# Patient Record
Sex: MALE | Race: WHITE
[De-identification: names, ages, dates, MRNs, and addresses within clinical notes are randomized per-mention and may not be internally consistent; named-entity substitution may affect disease eponyms.]

---

## 2017-10-13 ENCOUNTER — HOSPITAL ENCOUNTER (INPATIENT)
Dept: HOSPITAL 47 - EC | Age: 47
LOS: 7 days | Discharge: PSYCHIATRIC OR SUBSTANCE USE FACILITY | DRG: 853 | End: 2017-10-20
Payer: MEDICARE

## 2017-10-13 VITALS — BODY MASS INDEX: 26.9 KG/M2

## 2017-10-13 DIAGNOSIS — K80.00: ICD-10-CM

## 2017-10-13 DIAGNOSIS — N39.0: ICD-10-CM

## 2017-10-13 DIAGNOSIS — T73.0XXA: ICD-10-CM

## 2017-10-13 DIAGNOSIS — F23: ICD-10-CM

## 2017-10-13 DIAGNOSIS — Z79.899: ICD-10-CM

## 2017-10-13 DIAGNOSIS — E87.1: ICD-10-CM

## 2017-10-13 DIAGNOSIS — Z87.891: ICD-10-CM

## 2017-10-13 DIAGNOSIS — K76.0: ICD-10-CM

## 2017-10-13 DIAGNOSIS — E86.0: ICD-10-CM

## 2017-10-13 DIAGNOSIS — Z82.49: ICD-10-CM

## 2017-10-13 DIAGNOSIS — A41.9: Primary | ICD-10-CM

## 2017-10-13 DIAGNOSIS — K72.00: ICD-10-CM

## 2017-10-13 DIAGNOSIS — E87.6: ICD-10-CM

## 2017-10-13 LAB
ALP SERPL-CCNC: 2131 U/L (ref 38–126)
ALT SERPL-CCNC: 125 U/L (ref 21–72)
ANION GAP SERPL CALC-SCNC: 33 MMOL/L
APTT BLD: 28.4 SEC (ref 22–30)
AST SERPL-CCNC: 273 U/L (ref 17–59)
BASOPHILS # BLD AUTO: 0.1 K/UL (ref 0–0.2)
BASOPHILS NFR BLD AUTO: 0 %
BILIRUB UR QL STRIP.AUTO: (no result)
BUN SERPL-SCNC: 24 MG/DL (ref 9–20)
CALCIUM SPEC-MCNC: 10.1 MG/DL (ref 8.4–10.2)
CH: 30.1
CHCM: 34.5
CHLORIDE SERPL-SCNC: 81 MMOL/L (ref 98–107)
CO2 SERPL-SCNC: 19 MMOL/L (ref 22–30)
EOSINOPHIL # BLD AUTO: 0.1 K/UL (ref 0–0.7)
EOSINOPHIL NFR BLD AUTO: 0 %
ERYTHROCYTE [DISTWIDTH] IN BLOOD BY AUTOMATED COUNT: 5.5 M/UL (ref 4.3–5.9)
ERYTHROCYTE [DISTWIDTH] IN BLOOD: 14 % (ref 11.5–15.5)
GLUCOSE SERPL-MCNC: 180 MG/DL (ref 74–99)
HCT VFR BLD AUTO: 48.3 % (ref 39–53)
HDW: 2.73
HGB BLD-MCNC: 16 GM/DL (ref 13–17.5)
INR PPP: 1.2 (ref ?–1.2)
KETONES UR QL STRIP.AUTO: (no result)
LUC NFR BLD AUTO: 1 %
LYMPHOCYTES # SPEC AUTO: 1.5 K/UL (ref 1–4.8)
LYMPHOCYTES NFR SPEC AUTO: 11 %
MAGNESIUM SPEC-SCNC: 2.6 MG/DL (ref 1.6–2.3)
MCH RBC QN AUTO: 29 PG (ref 25–35)
MCHC RBC AUTO-ENTMCNC: 33.1 G/DL (ref 31–37)
MCV RBC AUTO: 87.8 FL (ref 80–100)
MONOCYTES # BLD AUTO: 0.6 K/UL (ref 0–1)
MONOCYTES NFR BLD AUTO: 5 %
NEUTROPHILS # BLD AUTO: 11.7 K/UL (ref 1.3–7.7)
NEUTROPHILS NFR BLD AUTO: 83 %
NON-AFRICAN AMERICAN GFR(MDRD): >60
PARTICLE COUNT: (no result)
PH UR: 6 [PH] (ref 5–8)
PHOSPHATE SERPL-MCNC: 4 MG/DL (ref 2.5–4.5)
POTASSIUM SERPL-SCNC: 3.3 MMOL/L (ref 3.5–5.1)
PROT SERPL-MCNC: 7.7 G/DL (ref 6.3–8.2)
PROT UR QL: (no result)
PT BLD: 12.3 SEC (ref 9–12)
RBC UR QL: >182 /HPF (ref 0–5)
SODIUM SERPL-SCNC: 133 MMOL/L (ref 137–145)
SP GR UR: 1.02 (ref 1–1.03)
UA BILLING (MACRO VS. MICRO): (no result)
UROBILINOGEN UR QL STRIP: 6 MG/DL (ref ?–2)
WBC # BLD AUTO: 0.12 10*3/UL
WBC # BLD AUTO: 14.1 K/UL (ref 3.8–10.6)
WBC #/AREA URNS HPF: 30 /HPF (ref 0–5)
WBC (PEROX): 14.59

## 2017-10-13 PROCEDURE — 36415 COLL VENOUS BLD VENIPUNCTURE: CPT

## 2017-10-13 PROCEDURE — 82550 ASSAY OF CK (CPK): CPT

## 2017-10-13 PROCEDURE — 96365 THER/PROPH/DIAG IV INF INIT: CPT

## 2017-10-13 PROCEDURE — 99285 EMERGENCY DEPT VISIT HI MDM: CPT

## 2017-10-13 PROCEDURE — 76705 ECHO EXAM OF ABDOMEN: CPT

## 2017-10-13 PROCEDURE — 82103 ALPHA-1-ANTITRYPSIN TOTAL: CPT

## 2017-10-13 PROCEDURE — 80074 ACUTE HEPATITIS PANEL: CPT

## 2017-10-13 PROCEDURE — 80306 DRUG TEST PRSMV INSTRMNT: CPT

## 2017-10-13 PROCEDURE — 71020: CPT

## 2017-10-13 PROCEDURE — 82728 ASSAY OF FERRITIN: CPT

## 2017-10-13 PROCEDURE — 87086 URINE CULTURE/COLONY COUNT: CPT

## 2017-10-13 PROCEDURE — 85610 PROTHROMBIN TIME: CPT

## 2017-10-13 PROCEDURE — 82075 ASSAY OF BREATH ETHANOL: CPT

## 2017-10-13 PROCEDURE — 83550 IRON BINDING TEST: CPT

## 2017-10-13 PROCEDURE — 96375 TX/PRO/DX INJ NEW DRUG ADDON: CPT

## 2017-10-13 PROCEDURE — 87390 HIV-1 AG IA: CPT

## 2017-10-13 PROCEDURE — 81001 URINALYSIS AUTO W/SCOPE: CPT

## 2017-10-13 PROCEDURE — 80320 DRUG SCREEN QUANTALCOHOLS: CPT

## 2017-10-13 PROCEDURE — 85730 THROMBOPLASTIN TIME PARTIAL: CPT

## 2017-10-13 PROCEDURE — 82390 ASSAY OF CERULOPLASMIN: CPT

## 2017-10-13 PROCEDURE — 87040 BLOOD CULTURE FOR BACTERIA: CPT

## 2017-10-13 PROCEDURE — 83735 ASSAY OF MAGNESIUM: CPT

## 2017-10-13 PROCEDURE — 84165 PROTEIN E-PHORESIS SERUM: CPT

## 2017-10-13 PROCEDURE — 84443 ASSAY THYROID STIM HORMONE: CPT

## 2017-10-13 PROCEDURE — 84100 ASSAY OF PHOSPHORUS: CPT

## 2017-10-13 PROCEDURE — 80053 COMPREHEN METABOLIC PANEL: CPT

## 2017-10-13 PROCEDURE — 70450 CT HEAD/BRAIN W/O DYE: CPT

## 2017-10-13 PROCEDURE — 83690 ASSAY OF LIPASE: CPT

## 2017-10-13 PROCEDURE — 82330 ASSAY OF CALCIUM: CPT

## 2017-10-13 PROCEDURE — 85025 COMPLETE CBC W/AUTO DIFF WBC: CPT

## 2017-10-13 PROCEDURE — 88304 TISSUE EXAM BY PATHOLOGIST: CPT

## 2017-10-13 PROCEDURE — 94760 N-INVAS EAR/PLS OXIMETRY 1: CPT

## 2017-10-13 PROCEDURE — 83516 IMMUNOASSAY NONANTIBODY: CPT

## 2017-10-13 PROCEDURE — 96361 HYDRATE IV INFUSION ADD-ON: CPT

## 2017-10-13 PROCEDURE — 84132 ASSAY OF SERUM POTASSIUM: CPT

## 2017-10-13 PROCEDURE — 86038 ANTINUCLEAR ANTIBODIES: CPT

## 2017-10-13 PROCEDURE — 74177 CT ABD & PELVIS W/CONTRAST: CPT

## 2017-10-13 PROCEDURE — 93005 ELECTROCARDIOGRAM TRACING: CPT

## 2017-10-13 PROCEDURE — 83540 ASSAY OF IRON: CPT

## 2017-10-13 RX ADMIN — MORPHINE SULFATE SCH MG: 30 TABLET, FILM COATED, EXTENDED RELEASE ORAL at 22:28

## 2017-10-13 RX ADMIN — MORPHINE SULFATE SCH: 30 TABLET, FILM COATED, EXTENDED RELEASE ORAL at 16:35

## 2017-10-13 RX ADMIN — CEFAZOLIN SCH MLS/HR: 330 INJECTION, POWDER, FOR SOLUTION INTRAMUSCULAR; INTRAVENOUS at 15:15

## 2017-10-13 RX ADMIN — ONDANSETRON PRN MG: 2 INJECTION INTRAMUSCULAR; INTRAVENOUS at 15:45

## 2017-10-13 NOTE — P.HPIM
History of Present Illness


H&P Date: 10/13/17


Chief Complaint: Brought here by family with complaints of delusions





The patient is a 46-year-old  male with a past history of 

schizophrenia who presents to the ER via private vehicle under the current her 

vision of his family.  The family reports the patient has been acting 

increasingly paranoid and has not been eating over the last 52 days, they first 

noticed a change in his behavior 2 weeks to a month ago, they report the 

patient has been increasingly withdrawn has not been as communicative via phone 

calls or text messages, and has become increasingly agitated and disheveled.  

He reports that the patient has been hearing voices. 


The patient is slightly agitated but is relatively pleasant, he reports some 

paranoid delusions of being monitored by [employer] agents are interested 

in him because of a special brainwave that he posesses.  He reports that these 

government agents are monitoring him from across the street.  He reports that 

they placed a implantable device in his skull several years ago and have been 

able to translate his thoughts into text without him speaking.  The patient 

reports that he is attempting to prove a point to these government rogue agents 

that he can commit suicide by not eating, he does report some episodes of 

nausea and vomiting.  But otherwise has no somatic complaints Other than 

chronic back pain for which he takes morphine.  The patient reports that 

approximately 50 pound weight loss and reports that his urine is tea colored.  

In the ER spoke to his family who are concerned, they have completed a 

[facility] form.  





Past Medical History


Additional Past Medical History / Comment(s): back pain


History of Any Multi-Drug Resistant Organisms: None Reported


Past Surgical History: Back Surgery, Tonsillectomy


Past Anesthesia/Blood Transfusion Reactions: No Reported Reaction


Past Psychological History: Depression


Smoking Status: Former smoker


Past Alcohol Use History: None Reported


Past Drug Use History: None Reported





Medications and Allergies


 Home Medications











 Medication  Instructions  Recorded  Confirmed  Type


 


Doxazosin [Cardura] 1 mg PO DAILY 10/13/17 10/13/17 History


 


Morphine Sulfate [Morphine Sulfate 30 mg PO TID 10/13/17 10/13/17 History





ER]    


 


predniSONE 12.5 mg PO DAILY 10/13/17 10/13/17 History











 Allergies











Allergy/AdvReac Type Severity Reaction Status Date / Time


 


No Known Allergies Allergy   Verified 10/13/17 11:39














Physical Exam


Vitals: 


 Vital Signs











  Temp Pulse Resp BP Pulse Ox


 


 10/13/17 15:16   102 H  18  117/84  100


 


 10/13/17 13:35  98.1 F  87  16  121/82  100


 


 10/13/17 12:54   84  18  132/81  94 L


 


 10/13/17 11:06  97.7 F  154 H  18  114/80  100








 Intake and Output











 10/13/17 10/13/17 10/13/17





 06:59 14:59 22:59


 


Other:   


 


  Weight  77.428 kg 








 Patient Weight











 10/14/17





 06:59


 


Weight 77.428 kg














Constitutional: , conversant, pleasant





Eyes: Anicteric sclerae, moist conjunctiva, no lid-lag, PERRLA





ENMT: NC/AT,Oropharynx clear, no erythema, exudates, dry mucous membranes





Neck:Supple, FROM, no masses, or JVD, No carotid bruits; No thyromegaly





Lungs: Clear to auscultation, Clear to percussion, Normal respiratory effort, 

no accessory muscle use 





Cardiovascular: Heart regular in rate and rhythm,  No murmurs, gallops, or rubs 

no peripheral edema





Abdominal: Soft Nontender, nom distended, no guarding, no rebound or  rigidity, 

Normoactive bowel sounds No hepatomegaly, No splenomegaly,  No palpable mass No 

abdominal wall hernia noted 





Skin: Normal temperature, tone, texture, and increased skin tenting, No 

induration No subcutaneous nodules, No rash, lesions, No ulcers





Extremities:No digital cyanosis No clubbing, Pedal pulses intact and  

symmetrical Radial pulses intact and symmetrical Normal gait and station, No 

calf tenderness


 


Psychiatric: Alert and oriented to person, place and time, anxious, disheveled, 

paranoid





Neuro: Muscles Strength 5/5 in all 4 extremities, Sensation to light touch 

grossly present throughout, Cranial nerves II-XII grossly intact. No focal 

sensory deficits








Results


CBC & Chem 7: 


 10/13/17 11:21





 10/13/17 11:21


Labs: 


 Abnormal Lab Results - Last 24 Hours (Table)











  10/13/17 10/13/17 10/13/17 Range/Units





  11:21 11:21 11:21 


 


WBC   14.1 H   (3.8-10.6)  k/uL


 


Plt Count   472 H   (150-450)  k/uL


 


Neutrophils #   11.7 H   (1.3-7.7)  k/uL


 


PT    12.3 H  (9.0-12.0)  sec


 


INR    1.2 H  (<1.2)  


 


Sodium  133 L    (137-145)  mmol/L


 


Potassium  3.3 L    (3.5-5.1)  mmol/L


 


Chloride  81 L    ()  mmol/L


 


Carbon Dioxide  19 L    (22-30)  mmol/L


 


BUN  24 H    (9-20)  mg/dL


 


Glucose  180 H    (74-99)  mg/dL


 


Ionized Calcium Lory  4.0 L    (4.5-5.3)  mg/dL


 


Magnesium  2.6 H    (1.6-2.3)  mg/dL


 


Total Bilirubin  5.6 H    (0.2-1.3)  mg/dL


 


AST  273 H    (17-59)  U/L


 


ALT  125 H    (21-72)  U/L


 


Alkaline Phosphatase  2131 H    ()  U/L


 


Creatine Kinase     ()  U/L


 


Lipase     ()  U/L














  10/13/17 10/13/17 Range/Units





  11:21 11:21 


 


WBC    (3.8-10.6)  k/uL


 


Plt Count    (150-450)  k/uL


 


Neutrophils #    (1.3-7.7)  k/uL


 


PT    (9.0-12.0)  sec


 


INR    (<1.2)  


 


Sodium    (137-145)  mmol/L


 


Potassium    (3.5-5.1)  mmol/L


 


Chloride    ()  mmol/L


 


Carbon Dioxide    (22-30)  mmol/L


 


BUN    (9-20)  mg/dL


 


Glucose    (74-99)  mg/dL


 


Ionized Calcium Lory    (4.5-5.3)  mg/dL


 


Magnesium    (1.6-2.3)  mg/dL


 


Total Bilirubin    (0.2-1.3)  mg/dL


 


AST    (17-59)  U/L


 


ALT    (21-72)  U/L


 


Alkaline Phosphatase    ()  U/L


 


Creatine Kinase  34 L   ()  U/L


 


Lipase   610 H  ()  U/L














Assessment and Plan


(1) Dehydration with hyponatremia


Status: Acute   





(2) Leukocytosis


Status: Acute   





(3) Hypokalemia


Status: Acute   





(4) Transaminitis


Status: Acute   





(5) Schizophrenia, chronic with acute exacerbation


Status: Acute   


Plan: 





The patient is admitted to the medical floor anticipate a greater than TWO 

midnight stay, with severe dehydration with hyponatremia hypokalemia secondary 

to acute schizophrenia exacerbation.  The patient is started on fluids, regular 

diet with antiemetics and electrolyte supplementation. We'll workup his 

transaminitis and abnormal liver studies with a right upper quadrant ultrasound 

to rule out any hepatobiliary disorder, with his leukocytosis we'll check a 

blood culture and urinalysis.  We'll also check HIV and acute hepatitis panel.  

We'll consult psychiatry for further recommendations, the patient will likely 

need to be transferred to medical health unit after he is clinically medically 

stable.

## 2017-10-13 NOTE — XR
EXAMINATION TYPE: XR chest 2V

 

DATE OF EXAM: 10/13/2017

 

COMPARISON: NONE

 

HISTORY: Weakness

 

TECHNIQUE:  Frontal and lateral views of the chest are obtained.

 

FINDINGS:  There are overlying cardiac leads. Cardiomediastinal silhouette, pulmonary vascularity and
 josie within normal limits. No airspace disease, pneumothorax, or pleural effusion.

 

IMPRESSION:  No acute cardiopulmonary process.

## 2017-10-13 NOTE — US
EXAMINATION TYPE: US abdomen limited

 

DATE OF EXAM: 10/13/2017

 

COMPARISON: NONE

 

CLINICAL HISTORY: transaminitis . Extremely limited and difficult exam due to overlying bowel gas 

 

EXAM MEASUREMENTS:

 

Liver Length:  14.2 cm   

Gallbladder Wall:  0.5 cm   

CBD:  0.5 cm

Right Kidney:  9.8 x 6.1 x 6.4 cm

 

 

 

Pancreas:  Obscured by bowel gas

Liver:  Limited evaluation due to overlying bowel gas and patient body habitus   

Gallbladder:  Echogenic debris visualized within the gallbladder, gallbladder wall is thickened 

**Evidence for sonographic Mackay's sign:  No

CBD:  wnl as visualized, distal portion obscured by bowel gas 

Right Kidney:  Cystic area visualized mid pole measuring 2.9 x 2.4 x 2.2 cm   

 

 

 

IMPRESSION: Limited exam. Correlate for cholecystitis. Cystic lesion right kidney, follow-up recommen
ded.

## 2017-10-14 LAB
ALP SERPL-CCNC: 1343 U/L (ref 38–126)
ALT SERPL-CCNC: 146 U/L (ref 21–72)
ANION GAP SERPL CALC-SCNC: 13 MMOL/L
AST SERPL-CCNC: 396 U/L (ref 17–59)
BASOPHILS # BLD AUTO: 0 K/UL (ref 0–0.2)
BASOPHILS NFR BLD AUTO: 0 %
BUN SERPL-SCNC: 22 MG/DL (ref 9–20)
CALCIUM SPEC-MCNC: 8.3 MG/DL (ref 8.4–10.2)
CH: 29.9
CHCM: 34.3
CHLORIDE SERPL-SCNC: 94 MMOL/L (ref 98–107)
CO2 SERPL-SCNC: 27 MMOL/L (ref 22–30)
EOSINOPHIL # BLD AUTO: 0 K/UL (ref 0–0.7)
EOSINOPHIL NFR BLD AUTO: 0 %
ERYTHROCYTE [DISTWIDTH] IN BLOOD BY AUTOMATED COUNT: 4 M/UL (ref 4.3–5.9)
ERYTHROCYTE [DISTWIDTH] IN BLOOD: 13.8 % (ref 11.5–15.5)
GLUCOSE SERPL-MCNC: 116 MG/DL (ref 74–99)
HCT VFR BLD AUTO: 35.1 % (ref 39–53)
HDW: 2.72
HGB BLD-MCNC: 11.5 GM/DL (ref 13–17.5)
LUC NFR BLD AUTO: 1 %
LYMPHOCYTES # SPEC AUTO: 1.1 K/UL (ref 1–4.8)
LYMPHOCYTES NFR SPEC AUTO: 18 %
MCH RBC QN AUTO: 28.8 PG (ref 25–35)
MCHC RBC AUTO-ENTMCNC: 32.9 G/DL (ref 31–37)
MCV RBC AUTO: 87.7 FL (ref 80–100)
MONOCYTES # BLD AUTO: 0.4 K/UL (ref 0–1)
MONOCYTES NFR BLD AUTO: 6 %
NEUTROPHILS # BLD AUTO: 4.6 K/UL (ref 1.3–7.7)
NEUTROPHILS NFR BLD AUTO: 74 %
NON-AFRICAN AMERICAN GFR(MDRD): >60
POTASSIUM SERPL-SCNC: 3.1 MMOL/L (ref 3.5–5.1)
PROT SERPL-MCNC: 5.5 G/DL (ref 6.3–8.2)
SODIUM SERPL-SCNC: 134 MMOL/L (ref 137–145)
WBC # BLD AUTO: 0.07 10*3/UL
WBC # BLD AUTO: 6.1 K/UL (ref 3.8–10.6)
WBC (PEROX): 6.32

## 2017-10-14 RX ADMIN — MORPHINE SULFATE SCH MG: 30 TABLET, FILM COATED, EXTENDED RELEASE ORAL at 08:34

## 2017-10-14 RX ADMIN — DOXAZOSIN SCH: 1 TABLET ORAL at 08:37

## 2017-10-14 RX ADMIN — PANTOPRAZOLE SODIUM SCH: 40 INJECTION, POWDER, FOR SOLUTION INTRAVENOUS at 08:41

## 2017-10-14 RX ADMIN — PANTOPRAZOLE SODIUM SCH MG: 40 INJECTION, POWDER, FOR SOLUTION INTRAVENOUS at 08:36

## 2017-10-14 RX ADMIN — CEFAZOLIN SCH MLS/HR: 330 INJECTION, POWDER, FOR SOLUTION INTRAMUSCULAR; INTRAVENOUS at 12:23

## 2017-10-14 RX ADMIN — POTASSIUM CHLORIDE SCH MEQ: 20 TABLET, EXTENDED RELEASE ORAL at 22:53

## 2017-10-14 RX ADMIN — SODIUM CHLORIDE SCH MLS/HR: 9 INJECTION, SOLUTION INTRAVENOUS at 13:43

## 2017-10-14 RX ADMIN — CEFAZOLIN SCH MLS/HR: 330 INJECTION, POWDER, FOR SOLUTION INTRAMUSCULAR; INTRAVENOUS at 04:03

## 2017-10-14 RX ADMIN — CEPHALEXIN SCH MG: 500 CAPSULE ORAL at 21:53

## 2017-10-14 RX ADMIN — ONDANSETRON PRN MG: 2 INJECTION INTRAMUSCULAR; INTRAVENOUS at 04:06

## 2017-10-14 RX ADMIN — MORPHINE SULFATE SCH MG: 30 TABLET, FILM COATED, EXTENDED RELEASE ORAL at 15:44

## 2017-10-14 RX ADMIN — CEFAZOLIN SCH: 330 INJECTION, POWDER, FOR SOLUTION INTRAMUSCULAR; INTRAVENOUS at 17:20

## 2017-10-14 RX ADMIN — SODIUM CHLORIDE SCH MLS/HR: 9 INJECTION, SOLUTION INTRAVENOUS at 11:53

## 2017-10-14 RX ADMIN — DOXAZOSIN SCH MG: 1 TABLET ORAL at 08:36

## 2017-10-14 RX ADMIN — CEFAZOLIN SCH MLS/HR: 330 INJECTION, POWDER, FOR SOLUTION INTRAMUSCULAR; INTRAVENOUS at 10:48

## 2017-10-14 RX ADMIN — CEFAZOLIN SCH MLS/HR: 330 INJECTION, POWDER, FOR SOLUTION INTRAMUSCULAR; INTRAVENOUS at 17:24

## 2017-10-14 RX ADMIN — CEPHALEXIN SCH MG: 500 CAPSULE ORAL at 10:46

## 2017-10-14 NOTE — P.CN
Psychiatric Consult





- .


Consult date: 10/14/17


Consult:: 





10/14/17 21:43


IDENTIFYING DATA: 46-year-old  male patient


HPI: Patient admitted to the medical floor [facility] with 

concerns of him not eating for the last 52 days.  Per chart history was 

admitted with dehydration/hyponatremia, hypokalemia, leukocytosis and 

transaminitis.  Per chart history the patient was not eating for the last 52 

days, there is notation in the chart about increased paranoia, increased 

agitation and disheveled.  Per chart history of change in behavior over the 

past 2 weeks including more withdrawn.  Per chart history there is notation 

regarding patient having thoughts about being monitored by government agents.  

The patient states "I don't want to get into it."  He says that he got caught 

starving himself to death, was trying to make a point to some people regarding 

committing suicide to get off a certain device.  He makes reference to this is 

the third time that they have done this to him and he does relate he went from 

230 pounds to 170 pounds.  He makes reference to starving himself for 103 days.

  Patient states that he started eating again, and relays is eating in the 

hospital.  He says he was hoping at one point that his parents would find him 

dead but doesn't feel that way now.  He says he came here for his family to get 

healthy.  He relates that he is not open to getting back on psychotropic 

medication.  There is a petition by a family member on the chart.


PAST PSYCHIATRIC HISTORY: Patient does of her previous admission to the 

inpatient psychiatric unit [facility] in November 2014.  At that time 

the impression was psychosis NOS.  And mood disorder secondary to general 

medical condition.  He was discharged on Risperdal 1 mg daily and to monitor 

bedtime, Cymbalta 20 mg twice a day, trazodone 100 mg at bedtime and Neurontin 

100 mg 3 times a day.  Per chart history has also been on Elavil and Zoloft in 

the past.  He relays "5 years ago they did it to me," and relays that it sent 

him to the [facility] for the first time.  He denies any recent outpatient 

treatment.


PMH: Back pain, back surgery, tonsillectomy, spinal stenosis


ALLERGIES: No known ALLERGIES


MEDICATIONS: Keflex, Cardura, milk of magnesia when necessary, MS Contin, 

Narcan when necessary, Zofran when necessary, Protonix


CHEMICAL DEPENDENCY HISTORY: Denies


FAMILY PSYCHIATRIC HISTORY:  None known


FAMILY CHEMICAL DEPENDENCY HISTORY: None known at this time


SOCIAL HISTORY: Not known


MENTAL STATUS EXAM: He is alert, found in his room lying in bed.  He was about 

to brush his teeth.  Patient relates several times during the interview that he 

'doesn't feel like getting into it' so not as much information is gathered.  He 

denies any current suicidal ideations.  He says he didn't have any to begin 

with.  He has not verbalize any thoughts of harm to others.  There is evidence 

of some persecutory thought content.  He makes reference to trying to make a 

point to some people to get off a certain device, he was starving himself.  He 

made reference to this is the third time they have done this to him.  He does 

not show any significant agitation.


IMPRESSIONS: Unspecified psychotic disorder


PLAN: Would recommend inpatient psychiatric hospitalization after medical 

stabilization.  At this time patient does not seem to be agreeable to treatment 

and relays that he is not open to getting back on psychotropic medication.  It 

is noted that there is a petition on the chart by family member.  Patient is 

still being medically stabilized.  Patient should not be allowed to leave the 

hospital AMA as he will need further psychiatric stabilization.  Concerns of 

him having started himself for a significant period of time with 60 pound 

weight loss and concern of psychosis symptoms.  He does relate that he got 

caught starving himself to death.  Psychiatry to follow up with him on the 

medical floor.

## 2017-10-14 NOTE — P.PN
Subjective


Progress Note Date: 10/14/17





Objective





- Vital Signs


Vital signs: 


 Vital Signs











Temp  96.5 F L  10/14/17 07:00


 


Pulse  77   10/14/17 08:00


 


Resp  16   10/14/17 08:00


 


BP  122/77   10/14/17 07:00


 


Pulse Ox  98   10/14/17 11:49








 Intake & Output











 10/13/17 10/14/17 10/14/17





 18:59 06:59 18:59


 


Intake Total 2000  


 


Output Total  200 800


 


Balance 2000 -200 -800


 


Weight 77.428 kg  


 


Intake:   


 


  Amount of Fluid Infused ( 2000  





  ml)   


 


Output:   


 


  Urine  200 


 


  Emesis   800


 


Other:   


 


  Voiding Method   Urinal














- Exam





Constitutional: No acute distress, conversant, pleasant





Eyes: Anicteric sclerae, moist conjunctiva, no lid-lag, PERRLA





ENMT: NC/AT,Oropharynx clear, no erythema, exudates





Neck:Supple, FROM, no masses, or JVD, No carotid bruits; No thyromegaly





Lungs: Clear to auscultation, Clear to percussion, Normal respiratory effort, 

no accessory muscle use 





Cardiovascular: Heart regular in rate and rhythm,  No murmurs, gallops, or rubs 

no peripheral edema





Abdominal: Soft tender to palpation right upper quadrant, nom distended, no 

guarding, no rebound or  rigidity, Normoactive bowel sounds No hepatomegaly, No 

splenomegaly,  No palpable mass No abdominal wall hernia noted 





Skin: Normal temperature, tone, texture, turgor, No induration No subcutaneous 

nodules, No rash, lesions, No ulcers





Extremities:No digital cyanosis No clubbing, Pedal pulses intact and  

symmetrical Radial pulses intact and symmetrical Normal gait and station, No 

calf tenderness


 


Psychiatric: Alert and oriented to person, place and time, Appropriate affect 

Intact judgement   


      


Neuro: Muscles Strength 5/5 in all 4 extremities, Sensation to light touch 

grossly present throughout, Cranial nerves II-XII grossly intact. No focal 

sensory deficits








- Labs


CBC & Chem 7: 


 10/14/17 08:02





 10/14/17 08:02


Labs: 


 Abnormal Lab Results - Last 24 Hours (Table)











  10/13/17 10/13/17 10/13/17 Range/Units





  11:21 11:21 11:21 


 


RBC     (4.30-5.90)  m/uL


 


Hgb     (13.0-17.5)  gm/dL


 


Hct     (39.0-53.0)  %


 


Sodium  133 L    (137-145)  mmol/L


 


Potassium  3.3 L    (3.5-5.1)  mmol/L


 


Chloride  81 L    ()  mmol/L


 


Carbon Dioxide  19 L    (22-30)  mmol/L


 


BUN  24 H    (9-20)  mg/dL


 


Glucose  180 H    (74-99)  mg/dL


 


Calcium     (8.4-10.2)  mg/dL


 


Magnesium  2.6 H    (1.6-2.3)  mg/dL


 


Total Bilirubin  5.6 H    (0.2-1.3)  mg/dL


 


AST  273 H    (17-59)  U/L


 


ALT  125 H    (21-72)  U/L


 


Alkaline Phosphatase  2131 H    ()  U/L


 


Creatine Kinase   34 L   ()  U/L


 


Total Protein     (6.3-8.2)  g/dL


 


Albumin     (3.5-5.0)  g/dL


 


Lipase    610 H  ()  U/L


 


Urine Protein     (Negative)  


 


Urine Ketones     (Negative)  


 


Urine Blood     (Negative)  


 


Urine Bilirubin     (Negative)  


 


Ur Leukocyte Esterase     (Negative)  


 


Urine RBC     (0-5)  /hpf


 


Urine WBC     (0-5)  /hpf


 


Amorphous Sediment     (None)  /hpf


 


Urine Bacteria     (None)  /hpf


 


Urine Mucus     (None)  /hpf


 


Urine Opiates Screen     (NotDetected)  


 


U Marijuana (THC) Screen     (NotDetected)  














  10/13/17 10/14/17 10/14/17 Range/Units





  20:00 08:02 08:02 


 


RBC   4.00 L   (4.30-5.90)  m/uL


 


Hgb   11.5 L D   (13.0-17.5)  gm/dL


 


Hct   35.1 L   (39.0-53.0)  %


 


Sodium    134 L  (137-145)  mmol/L


 


Potassium    3.1 L  (3.5-5.1)  mmol/L


 


Chloride    94 L  ()  mmol/L


 


Carbon Dioxide     (22-30)  mmol/L


 


BUN    22 H  (9-20)  mg/dL


 


Glucose    116 H  (74-99)  mg/dL


 


Calcium    8.3 L  (8.4-10.2)  mg/dL


 


Magnesium     (1.6-2.3)  mg/dL


 


Total Bilirubin    2.7 H  (0.2-1.3)  mg/dL


 


AST    396 H  (17-59)  U/L


 


ALT    146 H  (21-72)  U/L


 


Alkaline Phosphatase    1343 H  ()  U/L


 


Creatine Kinase     ()  U/L


 


Total Protein    5.5 L  (6.3-8.2)  g/dL


 


Albumin    3.0 L  (3.5-5.0)  g/dL


 


Lipase     ()  U/L


 


Urine Protein  2+ H    (Negative)  


 


Urine Ketones  3+ H    (Negative)  


 


Urine Blood  Large H    (Negative)  


 


Urine Bilirubin  2+ H    (Negative)  


 


Ur Leukocyte Esterase  Moderate H    (Negative)  


 


Urine RBC  >182 H    (0-5)  /hpf


 


Urine WBC  30 H    (0-5)  /hpf


 


Amorphous Sediment  Rare H    (None)  /hpf


 


Urine Bacteria  Many H    (None)  /hpf


 


Urine Mucus  Many H    (None)  /hpf


 


Urine Opiates Screen  Detected H    (NotDetected)  


 


U Marijuana (THC) Screen  Detected H    (NotDetected)  














- Imaging and Cardiology


US - abdomen: report reviewed (Limited exam currently for cholecystitis, 

gallbladder bladder wall was thickened with evidence of sonographic Mackay sign)





Assessment and Plan


(1) Sepsis


Narrative/Plan: 





* Secondary to UTI versus acute cholecystitis urine cultures sent, initiated on 

oral Keflex today, afebrile leukocytosis resolved


* General surgery consulted to evaluate for lap cholecystectomy


Status: Acute   





(2) Dehydration with hyponatremia


Narrative/Plan: 





* Continue with IV fluids increase fluids to 1 50 mL per hour of normal saline


Status: Acute   





(3) Hypokalemia


Narrative/Plan: 





* Potassium still low we'll replace and recheck tomorrow


Status: Acute   





(4) Transaminitis


Narrative/Plan: 





* GI consult pending, hep panel HIV ordered, right upper quadrant ultrasound 

suggestive of possible acute cholecystitis


Status: Acute   





(5) Schizophrenia, chronic with acute exacerbation


Narrative/Plan: 





* Awaiting psychiatry consult


Status: Acute

## 2017-10-14 NOTE — CT
EXAMINATION TYPE: CT abdomen pelvis w con

 

DATE OF EXAM: 10/14/2017

 

REFERENCE: Previous ultrasound dated 10/13/2017

 

HISTORY: elevated LFT

HISTORY: Elevated liver enzymes, poss. gall bladder issues-per patient

 

REFERENCE: NONE

 

CT DLP: 764.80 mGy

Automated exposure control for dose reduction was used.

 

TECHNIQUE: Helical acquisition through the abdomen and pelvis was obtained following the oral ingesti
on of with Oral Contrast and following intravenous administration of 100 mL of Omnipaque 300. The yasmin
a was reformatted in axial, coronal and sagittal projections.

 

FINDINGS:  Visualized portions of the lungs are clear. There is no pleural or pericardial fluid. The 
heart is not enlarged.

 

Within the abdomen, the liver is normal in size. The liver is mildly fatty infiltrated. The spleen is
 normal. The gallbladder is unremarkable. I do not see evidence of pericholecystic fluid.

 

Both adrenal glands are normal.

 

There is a simple appearing, 4.2 cm exophytic cyst seen arising from the lower pole of the left kidne
y. There is a second, simple appearing, 2.6 cm lesion arising from the upper pole of the left kidney.
 There is a 2.7 cm medullary cyst seen arising from the mid polar region of the right kidney.

 

The pancreas is mildly fatty infiltrated.

 

There is no significant retroperitoneal, inguinal or iliac adenopathy.

 

The bladder is not distended. There is a small calcification present at the level of the right ureter
ovesicular junction. There is no associated hydroureter. This measures approximately 4 mm.

 

 There is no significant diverticular change and there is no evidence of diverticulitis. The appendix
 is normal.

 

Small bowel loops are normal in caliber.

 

No free fluid and no free air is seen.

 

There is been a previous interpedicular fusion at L5-S1. A sclerotic focus in the right femoral neck 
is likely a bone island. No bony destructive lesion is seen.

 

IMPRESSION: 

1. FATTY INFILTRATION OF THE LIVER.

2. SIMPLE APPEARING BILATERAL RENAL CYSTS.

3. CALCIFICATION AT THE LEVEL OF THE RIGHT UVJ MAY REPRESENT A CALCULUS WHICH IS NONOBSTRUCTING.

4. POSTSURGICAL CHANGES WITHIN THE SPINE.

## 2017-10-14 NOTE — CONS
CONSULTATION



DATE OF SERVICE:

10/14/17



REQUESTING PHYSICIAN:

Dr. Raymundo Ly.



REASON FOR CONSULTATION:

Elevated LFTs.



HISTORY OF PRESENT ILLNESS:

The patient is a 46-year-old pleasant white male who was admitted to the hospital with

history of schizophrenia, was admitted to the hospital because of progressive weakness,

nausea, vomiting, not feeling well for the last several days duration.  The patient

states that he has not been eating for the last 52 days duration and about 2 weeks ago

started having abdominal discomfort, nausea, vomiting, and not feeling well.  He came

to the emergency room.  He was noted to have elevated serum transaminases and elevated

bilirubin and alkaline phosphatase and hence we are consulted in regards to this issue.

The patient denies any liver disease in the past.  No history of jaundice or hepatitis.

No history of intravenous drug use.  He denies any new medications that were started

recently.  He denies any recent travel history.  Reports no alcohol use.  Does not

recall having any chronic liver disease in the past.



PAST MEDICAL HISTORY:

Significant for schizophrenia.



PAST SURGICAL HISTORY:

Tonsillectomy and back surgery.



MEDICATIONS:

At home:

1. Morphine sulfate.

2. Cardura.

3. Prednisone.



ALLERGIES:

None.



SOCIAL HISTORY:

No smoking or alcohol use.



FAMILY HISTORY:

Unremarkable.



REVIEW OF SYSTEMS:

Cardiopulmonary:  No chest pain, shortness of breath. Genitourinary: No dysuria or

hematuria.  Musculoskeletal: Unremarkable.  Skin:  Unremarkable.  Endocrine:

Unremarkable.  Psychiatric:  History of schizophrenia but he states he is not on any

medications recently.  Neurology:  Unremarkable.  ENT/ vision:  Unremarkable.

Constitutional:  No recent weight loss.  No fevers, chills or night sweats.



PHYSICAL EXAMINATION:

He appears comfortable.  No apparent distress.  VITAL SIGNS:  Stable.  Blood pressure

is 114/75, pulse rate 86, temperature 98.5. HEENT examination unremarkable.

Conjunctivae pink.  Sclerae anicteric.  Oral cavity no lesions. Neck: No JVD or lymph

node enlargement.  CHEST:  Clear to auscultation.  HEART:  Regular rate and rhythm.

ABDOMEN:  Soft.  Bowel sounds are positive.  No organomegaly.  Extremities:  No pedal

edema.  Skin:  No rashes.  Neuro:  She is alert and oriented x3.  No focal deficits.



LABS:

Done at the time of admission to the hospital:  WBC 6.1, hemoglobin 11.5, platelets are

normal.  T-bili 5.6, AST is 273, ALT is 125, alkaline phosphatase is 2131, today

alkaline phosphatase is down to 1343, T bili down to 2.7, , ALT is 146, lipase

is slightly up at 610.  INR is 1.2.



IMPRESSION:

This is a patient who presents to the hospital with  schizophrenia, presently on no

antipsychotic medications, presents to the hospital with nausea, vomiting, abdominal

discomfort, not feeling well for the last 2 weeks duration.  He states that he has been

starving for 52 days.  However, the patient does have schizophrenia as mentioned above.

He is noted to have elevated serum transaminases with significant elevation of alkaline

phosphatase and T-bilirubin consistent with intrahepatic cholestasis.  He did have

ultrasound of the gallbladder done and that showed no evidence of gallstones or acute

cholecystitis and no biliary ductal dilation. So most likely we are dealing with

intrahepatic pancreatic cholestasis, probably medication related, but on further

questioning, patient denies taking any medications other than prednisone, Cardura, and

some morphine sulfate.  None of which are likely to cause any medication induced

hepatitis.  Hence at this time, we will proceed with further workup for chronic liver

disease and also obtain a CT of the abdomen, pelvis to investigate further.



RECOMMENDATIONS:

1. CT of the abdomen and pelvis.

2. Workup for chronic liver disease.

3. Repeat labs in the morning and will follow up closely during hospital stay.

Thank you for this consultation.





MMODL / IJN: [id number] / Job#: [id number]

## 2017-10-15 LAB
ALP SERPL-CCNC: 807 U/L (ref 38–126)
ALP SERPL-CCNC: 834 U/L (ref 38–126)
ALT SERPL-CCNC: 72 U/L (ref 21–72)
ALT SERPL-CCNC: 79 U/L (ref 21–72)
ANION GAP SERPL CALC-SCNC: 12 MMOL/L
ANION GAP SERPL CALC-SCNC: 8 MMOL/L
AST SERPL-CCNC: 103 U/L (ref 17–59)
AST SERPL-CCNC: 91 U/L (ref 17–59)
BUN SERPL-SCNC: 11 MG/DL (ref 9–20)
BUN SERPL-SCNC: 14 MG/DL (ref 9–20)
CALCIUM SPEC-MCNC: 7.3 MG/DL (ref 8.4–10.2)
CALCIUM SPEC-MCNC: 7.4 MG/DL (ref 8.4–10.2)
CHLORIDE SERPL-SCNC: 96 MMOL/L (ref 98–107)
CHLORIDE SERPL-SCNC: 99 MMOL/L (ref 98–107)
CO2 SERPL-SCNC: 25 MMOL/L (ref 22–30)
CO2 SERPL-SCNC: 26 MMOL/L (ref 22–30)
GLUCOSE SERPL-MCNC: 145 MG/DL (ref 74–99)
GLUCOSE SERPL-MCNC: 95 MG/DL (ref 74–99)
IRON SERPL-MCNC: 62 UG/DL (ref 65–175)
NON-AFRICAN AMERICAN GFR(MDRD): >60
NON-AFRICAN AMERICAN GFR(MDRD): >60
POTASSIUM SERPL-SCNC: 2.9 MMOL/L (ref 3.5–5.1)
POTASSIUM SERPL-SCNC: 3.3 MMOL/L (ref 3.5–5.1)
PROT SERPL-MCNC: 4.5 G/DL (ref 6.3–8.2)
PROT SERPL-MCNC: 4.6 G/DL (ref 6.3–8.2)
SODIUM SERPL-SCNC: 133 MMOL/L (ref 137–145)
SODIUM SERPL-SCNC: 133 MMOL/L (ref 137–145)
TIBC SERPL-MCNC: 166 UG/DL (ref 228–460)

## 2017-10-15 RX ADMIN — CEFAZOLIN SCH MLS/HR: 330 INJECTION, POWDER, FOR SOLUTION INTRAMUSCULAR; INTRAVENOUS at 10:49

## 2017-10-15 RX ADMIN — PANTOPRAZOLE SODIUM SCH: 40 INJECTION, POWDER, FOR SOLUTION INTRAVENOUS at 07:57

## 2017-10-15 RX ADMIN — POTASSIUM CHLORIDE SCH: 20 TABLET, EXTENDED RELEASE ORAL at 10:47

## 2017-10-15 RX ADMIN — MORPHINE SULFATE SCH MG: 30 TABLET, FILM COATED, EXTENDED RELEASE ORAL at 16:27

## 2017-10-15 RX ADMIN — MORPHINE SULFATE SCH MG: 30 TABLET, FILM COATED, EXTENDED RELEASE ORAL at 00:04

## 2017-10-15 RX ADMIN — SODIUM CHLORIDE SCH MLS/HR: 9 INJECTION, SOLUTION INTRAVENOUS at 11:55

## 2017-10-15 RX ADMIN — CEFAZOLIN SCH: 330 INJECTION, POWDER, FOR SOLUTION INTRAMUSCULAR; INTRAVENOUS at 15:39

## 2017-10-15 RX ADMIN — CEFAZOLIN SCH MLS/HR: 330 INJECTION, POWDER, FOR SOLUTION INTRAMUSCULAR; INTRAVENOUS at 00:07

## 2017-10-15 RX ADMIN — MORPHINE SULFATE SCH MG: 30 TABLET, FILM COATED, EXTENDED RELEASE ORAL at 07:56

## 2017-10-15 RX ADMIN — MORPHINE SULFATE SCH MG: 30 TABLET, FILM COATED, EXTENDED RELEASE ORAL at 23:57

## 2017-10-15 RX ADMIN — POTASSIUM CHLORIDE SCH MEQ: 20 TABLET, EXTENDED RELEASE ORAL at 04:51

## 2017-10-15 RX ADMIN — CEPHALEXIN SCH MG: 500 CAPSULE ORAL at 20:24

## 2017-10-15 RX ADMIN — POTASSIUM CHLORIDE SCH MEQ: 20 TABLET, EXTENDED RELEASE ORAL at 00:06

## 2017-10-15 RX ADMIN — SODIUM CHLORIDE SCH MLS/HR: 9 INJECTION, SOLUTION INTRAVENOUS at 10:48

## 2017-10-15 RX ADMIN — POTASSIUM CHLORIDE SCH MEQ: 20 TABLET, EXTENDED RELEASE ORAL at 06:41

## 2017-10-15 RX ADMIN — DOXAZOSIN SCH: 1 TABLET ORAL at 07:57

## 2017-10-15 RX ADMIN — CEPHALEXIN SCH MG: 500 CAPSULE ORAL at 07:56

## 2017-10-15 RX ADMIN — POTASSIUM CHLORIDE SCH MEQ: 20 TABLET, EXTENDED RELEASE ORAL at 05:57

## 2017-10-15 NOTE — P.PN
Progress Note - Text


Progress Note Date: 10/15/17





Interval history: Patient seen in psychiatric follow-up today.  He relates that 

he won't pay me.  He is not agreeable to talk to me today.  He does have a male 

visitor sitting with him.





Mental status exam: Patient makes reference to that he won't pay me.  He is not 

agreeable to talk with me today.





Plan: Did relay to the patient that it is recommended for psychiatric 

stabilization.  He is not agreeable to talk with me today.  Petition is on the 

chart.  Recommend first clinical certain be done by the attending physician and 

then second clinical certain would be done by psychiatrist for involuntary 

admission recommended after medical stabilization.  Psychiatry will follow up.

## 2017-10-15 NOTE — PN
PROGRESS NOTE



The patient is a 46-year-old white male admitted to the hospital with abdominal pain,

some nausea, vomiting for the last 2 days duration.  The patient says that she has not

been eating for the last 2 to 3 days and has been having progressive weight loss

associated with generalized weakness. On admission to the hospital was noted to have

elevated serum transaminases as well as jaundice.  The patient has no history of

chronic liver disease.  As part of the workup he initially had ultrasound of the

abdomen done that was unremarkable except for gallbladder sludge. Subsequently CT of

abdomen and pelvis was ordered yesterday, which showed fatty liver; however, there was

no evidence of biliary ductal dilation or any pericholecystic fluid noted.  This

morning he states that he is doing well.  His abdominal pain has improved.  No further

episodes of nausea or vomiting.  He is able to eat well, tolerating diet.



PHYSICAL EXAMINATION:

Appears comfortable, in no apparent distress. Vital signs are stable. Blood pressure is

116/65, pulse is 94, temperature 96.7.

HEENT: Examination is unremarkable. Conjunctivae pink. Sclerae anicteric. Oral cavity,

no lesions. Neck: No jugular venous distention or lymph node enlargement. Chest was

clear to auscultation. Heart: Regular rate and rhythm.  Abdomen is soft, nontender,

nondistended.  Bowel sounds are positive.  No organomegaly. Extremities: No pedal

edema. Skin: No rashes. Neuro: He is alert and oriented x3.  No focal deficits.



LABS:

From today, AST is down to 103, ALT is down to 79, alkaline phosphatase is 834, and T

bilirubin is down to 1.3.  Basic metabolic panel is within normal limits and CBC is

within normal limits.



IMPRESSION:

The patient presents to the hospital with fatigue, weakness, and progressive weight

loss, not feeling well for the last several days duration associated with decreased

oral intake from intentional starvation. Noted to have elevated serum transaminases and

had intrahepatic cholestatic picture at the time of admission the hospital. Workup for

liver disease is still pending at the time of this dictation.  However, given the last

2 days his serum transaminases are improving and so is bilirubin and alkaline

phosphatase.  Ultrasound of the abdomen as well as CT of the abdomen did not show any

evidence of biliary ductal dilation, meaning that we are dealing with intrahepatic

cholestasis process at the present time.



RECOMMENDATIONS:

1. Await workup for chronic liver disease.

2. Discussed with the patient the CT scan findings.

3. We will follow him closely during his hospital stay.  If his liver counts continue

    to improve, we will just follow his serum transaminases.  Will continue to follow

    his LFTs closely.  However, if no obvious etiology may consider liver biopsy at a

    later date. The plan was discussed with the patient who is agreeable to it.

Thank you for this consultation.





RONNI / TRINI: [id number] / Job#: [id number]

## 2017-10-15 NOTE — P.GSCN
History of Present Illness


Consult date: 10/15/17


Reason for Consult: 





Cholelithiasis, cholecystitis


History of present illness: 





This a 46-year-old male who was admitted to the hospital.  Patient apparently 

admitted to starving himself.  He developed abdominal pain he was worked up 

found evidence of cholelithiasis and cholecystitis with thickened gallbladder 

wall.  Patient is currently being evaluated by psychiatry.





Past Medical History


Additional Past Medical History / Comment(s): back pain


History of Any Multi-Drug Resistant Organisms: None Reported


Past Surgical History: Back Surgery, Tonsillectomy


Past Anesthesia/Blood Transfusion Reactions: No Reported Reaction


Past Psychological History: Depression, Schizophrenia


Smoking Status: Former smoker


Past Alcohol Use History: None Reported


Past Drug Use History: None Reported





- Past Family History


  ** Mother


Family Medical History: Rheumatoid Arthritis (RA)





  ** Father


Family Medical History: Hypertension, Skin Disorder





Medications and Allergies


 Home Medications











 Medication  Instructions  Recorded  Confirmed  Type


 


Doxazosin [Cardura] 1 mg PO DAILY 10/13/17 10/13/17 History


 


Morphine Sulfate [Morphine Sulfate 30 mg PO TID 10/13/17 10/13/17 History





ER]    


 


predniSONE 12.5 mg PO DAILY 10/13/17 10/13/17 History











 Allergies











Allergy/AdvReac Type Severity Reaction Status Date / Time


 


No Known Allergies Allergy   Verified 10/13/17 11:39














Surgical - Exam


 Vital Signs











Temp Pulse Resp BP Pulse Ox


 


 97.7 F   154 H  18   114/80   100 


 


 10/13/17 11:06  10/13/17 11:06  10/13/17 11:06  10/13/17 11:06  10/13/17 11:06














- General


no distress





- Eyes


PERRL





- ENT


normal pinna





- Neck


no masses





- Respiratory


normal expansion





- Cardiovascular


Rhythm: regular





- Abdomen





Abdomen soft.  There is some mild right upper quadrant tenderness.  There is no 

rebound or guarding.





Results





- Labs





 10/14/17 08:02





 10/15/17 09:11


 Abnormal Lab Results - Last 24 Hours (Table)











  10/14/17 10/15/17 10/15/17 Range/Units





  21:17 03:11 09:11 


 


Sodium   133 L  133 L  (137-145)  mmol/L


 


Potassium  3.1 L  2.9 L*  3.3 L  (3.5-5.1)  mmol/L


 


Chloride   96 L   ()  mmol/L


 


Glucose    145 H  (74-99)  mg/dL


 


Calcium   7.4 L  7.3 L  (8.4-10.2)  mg/dL


 


AST   103 H  91 H  (17-59)  U/L


 


ALT   79 H   (21-72)  U/L


 


Alkaline Phosphatase   834 H  807 H  ()  U/L


 


Total Protein   4.5 L  4.6 L  (6.3-8.2)  g/dL


 


Albumin   2.3 L  2.4 L  (3.5-5.0)  g/dL








 Microbiology - Last 24 Hours (Table)











 10/13/17 20:00 Urine Culture - Preliminary





 Urine,Catheterized 


 


 10/13/17 14:34 Blood Culture - Preliminary





 Blood    No Growth after 24 hours


 


 10/13/17 14:35 Blood Culture - Preliminary





 Blood    No Growth after 24 hours








 Diabetes panel











  10/14/17 10/15/17 10/15/17 Range/Units





  21:17 03:11 09:11 


 


Sodium   133 L  133 L  (137-145)  mmol/L


 


Potassium  3.1 L  2.9 L*  3.3 L  (3.5-5.1)  mmol/L


 


Chloride   96 L  99  ()  mmol/L


 


Carbon Dioxide   25  26  (22-30)  mmol/L


 


BUN   14  11  (9-20)  mg/dL


 


Creatinine   0.80  0.80  (0.66-1.25)  mg/dL


 


Glucose   95  145 H  (74-99)  mg/dL


 


Calcium   7.4 L  7.3 L  (8.4-10.2)  mg/dL


 


AST   103 H  91 H  (17-59)  U/L


 


ALT   79 H  72  (21-72)  U/L


 


Alkaline Phosphatase   834 H  807 H  ()  U/L


 


Total Protein   4.5 L  4.6 L  (6.3-8.2)  g/dL


 


Albumin   2.3 L  2.4 L  (3.5-5.0)  g/dL








 Calcium panel











  10/15/17 10/15/17 Range/Units





  03:11 09:11 


 


Calcium  7.4 L  7.3 L  (8.4-10.2)  mg/dL


 


Albumin  2.3 L  2.4 L  (3.5-5.0)  g/dL








 Pituitary panel











  10/14/17 10/15/17 10/15/17 Range/Units





  21:17 03:11 09:11 


 


Sodium   133 L  133 L  (137-145)  mmol/L


 


Potassium  3.1 L  2.9 L*  3.3 L  (3.5-5.1)  mmol/L


 


Chloride   96 L  99  ()  mmol/L


 


Carbon Dioxide   25  26  (22-30)  mmol/L


 


BUN   14  11  (9-20)  mg/dL


 


Creatinine   0.80  0.80  (0.66-1.25)  mg/dL


 


Glucose   95  145 H  (74-99)  mg/dL


 


Calcium   7.4 L  7.3 L  (8.4-10.2)  mg/dL








 Adrenal panel











  10/14/17 10/15/17 10/15/17 Range/Units





  21:17 03:11 09:11 


 


Sodium   133 L  133 L  (137-145)  mmol/L


 


Potassium  3.1 L  2.9 L*  3.3 L  (3.5-5.1)  mmol/L


 


Chloride   96 L  99  ()  mmol/L


 


Carbon Dioxide   25  26  (22-30)  mmol/L


 


BUN   14  11  (9-20)  mg/dL


 


Creatinine   0.80  0.80  (0.66-1.25)  mg/dL


 


Glucose   95  145 H  (74-99)  mg/dL


 


Calcium   7.4 L  7.3 L  (8.4-10.2)  mg/dL


 


Total Bilirubin   1.3  1.2  (0.2-1.3)  mg/dL


 


AST   103 H  91 H  (17-59)  U/L


 


ALT   79 H  72  (21-72)  U/L


 


Alkaline Phosphatase   834 H  807 H  ()  U/L


 


Total Protein   4.5 L  4.6 L  (6.3-8.2)  g/dL


 


Albumin   2.3 L  2.4 L  (3.5-5.0)  g/dL














- Imaging


US - abdomen: report reviewed (5 mm thickened gallbladder wall, cholelithiasis)





Assessment and Plan


Plan: 





Cholelithiasis.  Cholecystitis.





Elevated liver function tests which have improved during his admission.





Patient should undergo laparoscopic cholecystectomy when he is medically clear, 

we will plan for laparoscopic cholecystectomy on Tuesday..

## 2017-10-15 NOTE — P.PN
Subjective


Progress Note Date: 10/15/17


Principal diagnosis: 





the patient is a 46-year-old  male with a history of schizophrenia the 

presented with acute paranoid delusions with exacerbation of his schizophrenia, 

with acute transaminitis secondary to likely acute cholecystitis and 

symptomatic cholelithiasis with ongoing electrolyte abnormalities hypokalemia. 

GI was consulted for further workup and general surgery was consulted and is 

planning a laparoscopic O cystectomy on Tuesday, 








patient in good spirits today, has no complaints, denies abdominal pain or 

nausea.  No acute events overnight





Objective





- Vital Signs


Vital signs: 


 Vital Signs











Temp  97.3 F L  10/15/17 07:00


 


Pulse  87   10/15/17 07:00


 


Resp  16   10/15/17 07:00


 


BP  111/68   10/15/17 07:00


 


Pulse Ox  100   10/15/17 07:00








 Intake & Output











 10/14/17 10/15/17 10/15/17





 18:59 06:59 18:59


 


Intake Total 700 400 


 


Output Total 800  


 


Balance -100 400 


 


Weight 77.428 kg 85.275 kg 


 


Intake:   


 


  Oral 700 400 


 


Output:   


 


  Emesis 800  


 


Other:   


 


  Voiding Method Urinal Toilet 


 


  # Voids 2 1 


 


  # Bowel Movements 1  














- Exam





Constitutional: No acute distress, conversant, pleasant





Eyes: Anicteric sclerae, moist conjunctiva, no lid-lag, PERRLA





ENMT: NC/AT,Oropharynx clear, no erythema, exudates





Neck:Supple, FROM, no masses, or JVD, No carotid bruits; No thyromegaly





Lungs: Clear to auscultation, Clear to percussion, Normal respiratory effort, 

no accessory muscle use 





Cardiovascular: Heart regular in rate and rhythm,  No murmurs, gallops, or rubs 

no peripheral edema





Abdominal: Soft tender to palpation right upper quadrant,positive Mackay sign 

nom distended, no guarding, no rebound or  rigidity, Normoactive bowel sounds 

No hepatomegaly, No splenomegaly,  No palpable mass No abdominal wall hernia 

noted 





Skin: Normal temperature, tone, texture, turgor, No induration No subcutaneous 

nodules, No rash, lesions, No ulcers





Extremities:No digital cyanosis No clubbing, Pedal pulses intact and  

symmetrical Radial pulses intact and symmetrical Normal gait and station, No 

calf tenderness


 


Psychiatric: Alert and oriented to person, place and time, Appropriate affect 

Intact judgement   


      


Neuro: Muscles Strength 5/5 in all 4 extremities, Sensation to light touch 

grossly present throughout, Cranial nerves II-XII grossly intact. No focal 

sensory deficits








- Labs


CBC & Chem 7: 


 10/14/17 08:02





 10/15/17 09:11


Labs: 


 Abnormal Lab Results - Last 24 Hours (Table)











  10/14/17 10/15/17 10/15/17 Range/Units





  21:17 03:11 09:11 


 


Sodium   133 L  133 L  (137-145)  mmol/L


 


Potassium  3.1 L  2.9 L*  3.3 L  (3.5-5.1)  mmol/L


 


Chloride   96 L   ()  mmol/L


 


Glucose    145 H  (74-99)  mg/dL


 


Calcium   7.4 L  7.3 L  (8.4-10.2)  mg/dL


 


AST   103 H  91 H  (17-59)  U/L


 


ALT   79 H   (21-72)  U/L


 


Alkaline Phosphatase   834 H  807 H  ()  U/L


 


Total Protein   4.5 L  4.6 L  (6.3-8.2)  g/dL


 


Albumin   2.3 L  2.4 L  (3.5-5.0)  g/dL








 Microbiology - Last 24 Hours (Table)











 10/13/17 20:00 Urine Culture - Preliminary





 Urine,Catheterized 


 


 10/13/17 14:34 Blood Culture - Preliminary





 Blood    No Growth after 24 hours


 


 10/13/17 14:35 Blood Culture - Preliminary





 Blood    No Growth after 24 hours














Assessment and Plan


(1) Sepsis


Narrative/Plan: 





* Secondary to UTI versus acute cholecystitis urine cultures sent, initiated on 

oral Keflex today, afebrile leukocytosis resolved


* General surgery Dr. Sanchez  planning lap cholecystectomy on Tuesday


Status: Acute   





(2) Dehydration with hyponatremia


Narrative/Plan: 





* Continue with IV fluids increase fluids to 1 50 mL per hour of normal saline


Status: Acute   





(3) Hypokalemia


Narrative/Plan: 





* Potassium still lowat 2.8 this morning we'll activate potassium replacement 

will call we'll replace and recheck tomorrow


Status: Acute   





(4) Transaminitis


Narrative/Plan: 





* likely secondary to acute cholecystitis


* GI Dr. Hensley Following, hep panel HIV ordered, right upper quadrant 

ultrasound suggestive of possible acute cholecystitis


Status: Acute   





(5) Schizophrenia, chronic with acute exacerbation


Narrative/Plan: 





* Awaiting psychiatry recommendations


Status: Acute

## 2017-10-16 LAB
ALP SERPL-CCNC: 631 U/L (ref 38–126)
ALT SERPL-CCNC: 62 U/L (ref 21–72)
ANION GAP SERPL CALC-SCNC: 6 MMOL/L
AST SERPL-CCNC: 46 U/L (ref 17–59)
BASOPHILS # BLD AUTO: 0 K/UL (ref 0–0.2)
BASOPHILS NFR BLD AUTO: 1 %
BUN SERPL-SCNC: 9 MG/DL (ref 9–20)
CALCIUM SPEC-MCNC: 7.3 MG/DL (ref 8.4–10.2)
CH: 28.1
CHCM: 31.2
CHLORIDE SERPL-SCNC: 104 MMOL/L (ref 98–107)
CO2 SERPL-SCNC: 27 MMOL/L (ref 22–30)
EOSINOPHIL # BLD AUTO: 0.1 K/UL (ref 0–0.7)
EOSINOPHIL NFR BLD AUTO: 2 %
ERYTHROCYTE [DISTWIDTH] IN BLOOD BY AUTOMATED COUNT: 3.42 M/UL (ref 4.3–5.9)
ERYTHROCYTE [DISTWIDTH] IN BLOOD: 12.9 % (ref 11.5–15.5)
GLUCOSE SERPL-MCNC: 109 MG/DL (ref 74–99)
HCT VFR BLD AUTO: 31 % (ref 39–53)
HDW: 2.83
HGB BLD-MCNC: 9.8 GM/DL (ref 13–17.5)
LUC NFR BLD AUTO: 2 %
LYMPHOCYTES # SPEC AUTO: 1.2 K/UL (ref 1–4.8)
LYMPHOCYTES NFR SPEC AUTO: 25 %
MCH RBC QN AUTO: 28.7 PG (ref 25–35)
MCHC RBC AUTO-ENTMCNC: 31.6 G/DL (ref 31–37)
MCV RBC AUTO: 90.6 FL (ref 80–100)
MONOCYTES # BLD AUTO: 0.4 K/UL (ref 0–1)
MONOCYTES NFR BLD AUTO: 7 %
NEUTROPHILS # BLD AUTO: 3.1 K/UL (ref 1.3–7.7)
NEUTROPHILS NFR BLD AUTO: 63 %
NON-AFRICAN AMERICAN GFR(MDRD): >60
POTASSIUM SERPL-SCNC: 3.5 MMOL/L (ref 3.5–5.1)
PROT SERPL-MCNC: 4.4 G/DL (ref 6.3–8.2)
SODIUM SERPL-SCNC: 137 MMOL/L (ref 137–145)
WBC # BLD AUTO: 0.09 10*3/UL
WBC # BLD AUTO: 4.9 K/UL (ref 3.8–10.6)
WBC (PEROX): 5.15

## 2017-10-16 RX ADMIN — DOXAZOSIN SCH: 1 TABLET ORAL at 08:22

## 2017-10-16 RX ADMIN — CEFAZOLIN SCH MLS/HR: 330 INJECTION, POWDER, FOR SOLUTION INTRAMUSCULAR; INTRAVENOUS at 00:30

## 2017-10-16 RX ADMIN — CEFAZOLIN SCH: 330 INJECTION, POWDER, FOR SOLUTION INTRAMUSCULAR; INTRAVENOUS at 04:51

## 2017-10-16 RX ADMIN — PANTOPRAZOLE SODIUM SCH MG: 40 INJECTION, POWDER, FOR SOLUTION INTRAVENOUS at 08:21

## 2017-10-16 RX ADMIN — CEFAZOLIN SCH MLS/HR: 330 INJECTION, POWDER, FOR SOLUTION INTRAMUSCULAR; INTRAVENOUS at 16:26

## 2017-10-16 RX ADMIN — MORPHINE SULFATE SCH MG: 30 TABLET, FILM COATED, EXTENDED RELEASE ORAL at 23:29

## 2017-10-16 RX ADMIN — CEFAZOLIN SCH MLS/HR: 330 INJECTION, POWDER, FOR SOLUTION INTRAMUSCULAR; INTRAVENOUS at 10:47

## 2017-10-16 RX ADMIN — CEPHALEXIN SCH MG: 500 CAPSULE ORAL at 20:59

## 2017-10-16 RX ADMIN — MORPHINE SULFATE SCH MG: 30 TABLET, FILM COATED, EXTENDED RELEASE ORAL at 16:26

## 2017-10-16 RX ADMIN — MORPHINE SULFATE SCH MG: 30 TABLET, FILM COATED, EXTENDED RELEASE ORAL at 21:00

## 2017-10-16 RX ADMIN — CEPHALEXIN SCH MG: 500 CAPSULE ORAL at 08:22

## 2017-10-16 RX ADMIN — MORPHINE SULFATE SCH MG: 30 TABLET, FILM COATED, EXTENDED RELEASE ORAL at 08:25

## 2017-10-16 RX ADMIN — HEPARIN SODIUM SCH UNIT: 5000 INJECTION, SOLUTION INTRAVENOUS; SUBCUTANEOUS at 23:31

## 2017-10-16 RX ADMIN — HEPARIN SODIUM SCH UNIT: 5000 INJECTION, SOLUTION INTRAVENOUS; SUBCUTANEOUS at 16:26

## 2017-10-16 RX ADMIN — CEFAZOLIN SCH MLS/HR: 330 INJECTION, POWDER, FOR SOLUTION INTRAMUSCULAR; INTRAVENOUS at 21:03

## 2017-10-16 RX ADMIN — POTASSIUM CHLORIDE SCH: 20 TABLET, EXTENDED RELEASE ORAL at 09:29

## 2017-10-16 NOTE — P.PN
Progress Note - Text


Progress Note Date: 10/16/17





Interval history: Patient seen in psychiatric follow-up today.  He refuses to 

speak to this provider, stating that he does not want to pay psychiatry.  Does 

have a meal tray in front of him and appears to be eating.  Spoke to patient's 

unit nurse who stated that patient has been eating all meals well.  Also, no 

reported hallucinations or delusions while on the medical floor.  





Mental status exam: Refusing to speak to this provider today.





Plan: Petition is on the chart. However, there has not been a certification 

done as of yet.  At this time, patient is no longer starving himself and not 

displaying any acutely psychotic symptoms warranting an involuntary admission 

in my opinion.  Patient is not willing to have psychiatric services 

voluntarily.  If he does display psychotic symptoms warranting further 

psychiatric intervention, please notify us and we will be glad to see patient 

again.  We will sign off for now.

## 2017-10-16 NOTE — P.PN
Subjective


Progress Note Date: 10/16/17





46-year-old male seen and examined at bedside patient reports he has 

intermittent episodes of denies any nausea vomiting.  Patient does give a 

history of having "tried to start myself with without food for 52 days drink a 

lot of water" patient states he has never tried that before but he was going 

through "some stuff" patient stated that he has lost about 75 pounds   patient 

is aware of the plan of care scheduled tomorrow for a lap cholecystectomy.  AST 

46, ALT 62, alkaline phosphatase 631 alpha-1 antitrypsin 149





Objective





- Vital Signs


Vital signs: 


 Vital Signs











Temp  97.2 F L  10/16/17 07:00


 


Pulse  79   10/16/17 07:00


 


Resp  20   10/16/17 07:00


 


BP  108/69   10/16/17 07:00


 


Pulse Ox  98   10/16/17 07:00








 Intake & Output











 10/15/17 10/16/17 10/16/17





 18:59 06:59 18:59


 


Intake Total 520 600 240


 


Balance 520 600 240


 


Weight  87.5 kg 


 


Intake:   


 


  Oral 520 600 240


 


Other:   


 


  # Voids 2  














- Exam





Physical exam


46-year-old male resting in bed talkative patient is stating that he cannot go 

to the mental health unit can afford the bill and he knows how to take care of 

things himself


Lungs essentially clear adequate air movement on room air


Heart S1-S2 audible regular


Abdomen soft not distended bowel tones present slight tenderness to the right 

lower quadrant no rebound no facial grimacing states urinating no difficulty no 

frequent stooling states no nausea vomiting and tolerating a diet


Extremities no edema





- Labs


CBC & Chem 7: 


 10/14/17 08:02





 10/16/17 08:49


Labs: 


 Abnormal Lab Results - Last 24 Hours (Table)











  10/14/17 10/14/17 10/16/17 Range/Units





  21:17 21:17 08:49 


 


Glucose    109 H  (74-99)  mg/dL


 


Calcium    7.3 L  (8.4-10.2)  mg/dL


 


Iron  62 L    ()  ug/dL


 


TIBC  166 L    (228-460)  ug/dL


 


Ferritin  1652.3 H    (22.0-322.0)  ng/mL


 


Alkaline Phosphatase    631 H  ()  U/L


 


Total Protein    4.4 L  (6.3-8.2)  g/dL


 


Total Protein (PEP)   4.9 L   (6.2-8.2)  g/dL


 


Albumin    2.2 L  (3.5-5.0)  g/dL








 Microbiology - Last 24 Hours (Table)











 10/13/17 20:00 Urine Culture - Final





 Urine,Catheterized 


 


 10/13/17 14:34 Blood Culture - Preliminary





 Blood    No Growth after 48 hours


 


 10/13/17 14:35 Blood Culture - Preliminary





 Blood    No Growth after 48 hours














Assessment and Plan


Plan: 





Impression


History of schizophrenia


Sepsis likely due to UTI


Abdominal pain present on admission suspect due to cholelithiasis and Acute 

cholecystitis


Present on admission dehydration resolved


Present on admission hyponatremia


Elevated liver enzymes trending down improving








Plan


Patient is scheduled tomorrow for a lap cholecystectomy


Pain control


Defer to admitting service and other consultants for medical issues


Repeat labs in am 


DVT and GI prophylaxis








The above impression and plan of care have been discussed and directed by 

signing physician. Tessie Leyva nurse practitioner acting as scribe for signing 

physician.

## 2017-10-16 NOTE — P.PN
Subjective


Progress Note Date: 10/16/17


Principal diagnosis: 





This 46-year-old male that was admitted secondary to delusions.  Initial workup 

showed elevation elevation of the liver enzymes and leukocytosis.  Patient 

found to have leukocytosis and urinary tract infection cultures have been 

negative and urine cultures positive for seble.





No patient denies any fever or chills at this time denies any dysuria 

hematuria.  Now complaining what seems like an exacerbation of his back pain.





Objective





- Vital Signs


Vital signs: 


 Vital Signs











Temp  97.2 F L  10/16/17 07:00


 


Pulse  79   10/16/17 07:00


 


Resp  20   10/16/17 07:00


 


BP  108/69   10/16/17 07:00


 


Pulse Ox  98   10/16/17 07:00








 Intake & Output











 10/15/17 10/16/17 10/16/17





 18:59 06:59 18:59


 


Intake Total 520 600 480


 


Balance 520 600 480


 


Weight  87.5 kg 


 


Intake:   


 


  Oral 520 600 480


 


Other:   


 


  # Voids 2  2














- Exam





gen:alert and oriented


lungs:clear to auscultation


heart:s1s2


abdomen:soft and depressible,non tender


ext:no edema





- Labs


CBC & Chem 7: 


 10/16/17 08:51





 10/16/17 08:49


Labs: 


 Abnormal Lab Results - Last 24 Hours (Table)











  10/14/17 10/14/17 10/16/17 Range/Units





  21:17 21:17 08:49 


 


RBC     (4.30-5.90)  m/uL


 


Hgb     (13.0-17.5)  gm/dL


 


Hct     (39.0-53.0)  %


 


Glucose    109 H  (74-99)  mg/dL


 


Calcium    7.3 L  (8.4-10.2)  mg/dL


 


Iron  62 L    ()  ug/dL


 


TIBC  166 L    (228-460)  ug/dL


 


Ferritin  1652.3 H    (22.0-322.0)  ng/mL


 


Alkaline Phosphatase    631 H  ()  U/L


 


Total Protein    4.4 L  (6.3-8.2)  g/dL


 


Total Protein (PEP)   4.9 L   (6.2-8.2)  g/dL


 


Albumin    2.2 L  (3.5-5.0)  g/dL














  10/16/17 Range/Units





  08:51 


 


RBC  3.42 L  (4.30-5.90)  m/uL


 


Hgb  9.8 L D  (13.0-17.5)  gm/dL


 


Hct  31.0 L  (39.0-53.0)  %


 


Glucose   (74-99)  mg/dL


 


Calcium   (8.4-10.2)  mg/dL


 


Iron   ()  ug/dL


 


TIBC   (228-460)  ug/dL


 


Ferritin   (22.0-322.0)  ng/mL


 


Alkaline Phosphatase   ()  U/L


 


Total Protein   (6.3-8.2)  g/dL


 


Total Protein (PEP)   (6.2-8.2)  g/dL


 


Albumin   (3.5-5.0)  g/dL








 Microbiology - Last 24 Hours (Table)











 10/13/17 20:00 Urine Culture - Final





 Urine,Catheterized 


 


 10/13/17 14:34 Blood Culture - Preliminary





 Blood    No Growth after 48 hours


 


 10/13/17 14:35 Blood Culture - Preliminary





 Blood    No Growth after 48 hours














Assessment and Plan


(1) Cholecystitis


Narrative/Plan: 








 plan for Cholecystectomy tomorrow


Status: Acute   





(2) Urinary tract infection


Narrative/Plan: 


Urine cultures negative continue Keflex


Status: Acute   





(3) Back pain


Narrative/Plan: 


Patient on morphine sulfate 30 mg 3 times a day as an outpatient.


Status: Acute   





(4) Acute psychosis


Narrative/Plan: 


Will need inpatient psychiatric evaluation and treatment.  Petition in the chart


Status: Acute

## 2017-10-17 LAB
ALP SERPL-CCNC: 522 U/L (ref 38–126)
ALT SERPL-CCNC: 50 U/L (ref 21–72)
ANION GAP SERPL CALC-SCNC: 10 MMOL/L
AST SERPL-CCNC: 29 U/L (ref 17–59)
BASOPHILS # BLD AUTO: 0.1 K/UL (ref 0–0.2)
BASOPHILS NFR BLD AUTO: 1 %
BUN SERPL-SCNC: 9 MG/DL (ref 9–20)
CALCIUM SPEC-MCNC: 7.3 MG/DL (ref 8.4–10.2)
CH: 29.8
CHCM: 32.6
CHLORIDE SERPL-SCNC: 104 MMOL/L (ref 98–107)
CO2 SERPL-SCNC: 24 MMOL/L (ref 22–30)
EOSINOPHIL # BLD AUTO: 0.1 K/UL (ref 0–0.7)
EOSINOPHIL NFR BLD AUTO: 1 %
ERYTHROCYTE [DISTWIDTH] IN BLOOD BY AUTOMATED COUNT: 3.3 M/UL (ref 4.3–5.9)
ERYTHROCYTE [DISTWIDTH] IN BLOOD: 14.6 % (ref 11.5–15.5)
GLUCOSE SERPL-MCNC: 96 MG/DL (ref 74–99)
HCT VFR BLD AUTO: 30.3 % (ref 39–53)
HDW: 2.79
HGB BLD-MCNC: 9.6 GM/DL (ref 13–17.5)
LUC NFR BLD AUTO: 1 %
LYMPHOCYTES # SPEC AUTO: 1.2 K/UL (ref 1–4.8)
LYMPHOCYTES NFR SPEC AUTO: 21 %
MCH RBC QN AUTO: 29 PG (ref 25–35)
MCHC RBC AUTO-ENTMCNC: 31.6 G/DL (ref 31–37)
MCV RBC AUTO: 91.9 FL (ref 80–100)
MONOCYTES # BLD AUTO: 0.5 K/UL (ref 0–1)
MONOCYTES NFR BLD AUTO: 9 %
NEUTROPHILS # BLD AUTO: 3.9 K/UL (ref 1.3–7.7)
NEUTROPHILS NFR BLD AUTO: 67 %
NON-AFRICAN AMERICAN GFR(MDRD): >60
POTASSIUM SERPL-SCNC: 3.8 MMOL/L (ref 3.5–5.1)
PROT SERPL-MCNC: 4.6 G/DL (ref 6.3–8.2)
SODIUM SERPL-SCNC: 138 MMOL/L (ref 137–145)
WBC # BLD AUTO: 0.08 10*3/UL
WBC # BLD AUTO: 5.8 K/UL (ref 3.8–10.6)
WBC (PEROX): 5.83

## 2017-10-17 PROCEDURE — 0FT44ZZ RESECTION OF GALLBLADDER, PERCUTANEOUS ENDOSCOPIC APPROACH: ICD-10-PCS

## 2017-10-17 RX ADMIN — MORPHINE SULFATE SCH: 30 TABLET, FILM COATED, EXTENDED RELEASE ORAL at 09:00

## 2017-10-17 RX ADMIN — MORPHINE SULFATE SCH MG: 30 TABLET, FILM COATED, EXTENDED RELEASE ORAL at 16:42

## 2017-10-17 RX ADMIN — ONDANSETRON PRN MG: 2 INJECTION INTRAMUSCULAR; INTRAVENOUS at 10:01

## 2017-10-17 RX ADMIN — CEFAZOLIN SCH MLS/HR: 330 INJECTION, POWDER, FOR SOLUTION INTRAMUSCULAR; INTRAVENOUS at 05:35

## 2017-10-17 RX ADMIN — CEPHALEXIN SCH MG: 500 CAPSULE ORAL at 20:43

## 2017-10-17 RX ADMIN — DOXAZOSIN SCH: 1 TABLET ORAL at 09:00

## 2017-10-17 RX ADMIN — POTASSIUM CHLORIDE SCH: 20 TABLET, EXTENDED RELEASE ORAL at 09:00

## 2017-10-17 RX ADMIN — KETOROLAC TROMETHAMINE SCH MG: 30 INJECTION, SOLUTION INTRAMUSCULAR at 18:04

## 2017-10-17 RX ADMIN — CEPHALEXIN SCH: 500 CAPSULE ORAL at 09:00

## 2017-10-17 RX ADMIN — HYDROMORPHONE HYDROCHLORIDE PRN MG: 1 INJECTION, SOLUTION INTRAMUSCULAR; INTRAVENOUS; SUBCUTANEOUS at 12:14

## 2017-10-17 RX ADMIN — CEFAZOLIN SCH MLS/HR: 330 INJECTION, POWDER, FOR SOLUTION INTRAMUSCULAR; INTRAVENOUS at 14:39

## 2017-10-17 RX ADMIN — KETOROLAC TROMETHAMINE SCH MG: 30 INJECTION, SOLUTION INTRAMUSCULAR at 13:41

## 2017-10-17 RX ADMIN — HYDROCODONE BITARTRATE AND ACETAMINOPHEN PRN EACH: 5; 325 TABLET ORAL at 14:29

## 2017-10-17 RX ADMIN — HYDROMORPHONE HYDROCHLORIDE PRN MG: 1 INJECTION, SOLUTION INTRAMUSCULAR; INTRAVENOUS; SUBCUTANEOUS at 12:25

## 2017-10-17 RX ADMIN — HEPARIN SODIUM SCH UNIT: 5000 INJECTION, SOLUTION INTRAVENOUS; SUBCUTANEOUS at 10:00

## 2017-10-17 RX ADMIN — DOCUSATE SODIUM SCH MG: 100 CAPSULE, LIQUID FILLED ORAL at 20:43

## 2017-10-17 RX ADMIN — PANTOPRAZOLE SODIUM SCH: 40 TABLET, DELAYED RELEASE ORAL at 07:30

## 2017-10-17 RX ADMIN — CEFAZOLIN SCH MLS/HR: 330 INJECTION, POWDER, FOR SOLUTION INTRAMUSCULAR; INTRAVENOUS at 20:42

## 2017-10-17 NOTE — P.PN
Subjective


Progress Note Date: 10/17/17


Principal diagnosis: 





This 46-year-old male that was admitted secondary to delusions.  Initial workup 

showed elevation elevation of the liver enzymes and leukocytosis.  Patient 

found to have leukocytosis and urinary tract infection cultures have been 

negative and urine cultures positive for seble.





No patient denies any fever or chills at this time denies any dysuria 

hematuria.  Complaining of some shortness in his abdominal site.  No nausea no 

vomiting 





Objective





- Vital Signs


Vital signs: 


 Vital Signs











Temp  97.0 F L  10/17/17 13:22


 


Pulse  114 H  10/17/17 13:22


 


Resp  20   10/17/17 13:22


 


BP  134/81   10/17/17 13:22


 


Pulse Ox  95   10/17/17 13:22








 Intake & Output











 10/16/17 10/17/17 10/17/17





 18:59 06:59 18:59


 


Intake Total 720  1750


 


Output Total   10


 


Balance 720  1740


 


Intake:   


 


  IV   1750


 


  Oral 720  


 


Output:   


 


  Estimated Blood Loss   10


 


Other:   


 


  # Voids 2 1 














- Exam





gen:alert and oriented


lungs:clear to auscultation


heart:s1s2


abdomen:soft and depressible,non tender, evidence of site where cholecystectomy 

was done,no bleeding


ext:no edema





- Labs


CBC & Chem 7: 


 10/17/17 08:13





 10/17/17 08:13


Labs: 


 Abnormal Lab Results - Last 24 Hours (Table)











  10/17/17 10/17/17 Range/Units





  08:13 08:13 


 


RBC   3.30 L  (4.30-5.90)  m/uL


 


Hgb   9.6 L  (13.0-17.5)  gm/dL


 


Hct   30.3 L  (39.0-53.0)  %


 


Calcium  7.3 L   (8.4-10.2)  mg/dL


 


Alkaline Phosphatase  522 H   ()  U/L


 


Total Protein  4.6 L   (6.3-8.2)  g/dL


 


Albumin  2.3 L   (3.5-5.0)  g/dL








 Microbiology - Last 24 Hours (Table)











 10/13/17 14:34 Blood Culture - Preliminary





 Blood    No Growth after 72 hours


 


 10/13/17 14:35 Blood Culture - Preliminary





 Blood    No Growth after 72 hours














Assessment and Plan


(1) Cholecystitis


Narrative/Plan: 


Status post cholecystectomy today


Now patient eating a liquid diet


Current Visit: Yes   Status: Acute   Code(s): K81.9 - CHOLECYSTITIS, 

UNSPECIFIED   SNOMED Code(s): 67152347


   





(2) Urinary tract infection


Narrative/Plan: 


Urine cultures negative continue Keflex


Current Visit: Yes   Status: Acute   Code(s): N39.0 - URINARY TRACT INFECTION, 

SITE NOT SPECIFIED   SNOMED Code(s): 40188135


   





(3) Back pain


Narrative/Plan: 


Patient on morphine sulfate 30 mg 3 times a day which is his outpatient 

treatment.


Current Visit: Yes   Status: Acute   Code(s): M54.9 - DORSALGIA, UNSPECIFIED   

SNOMED Code(s): 538294661


   





(4) Acute psychosis


Narrative/Plan: 


Will need inpatient psychiatric evaluation and treatment.  Petition in the chart


Current Visit: Yes   Status: Acute   Code(s): F23 - BRIEF PSYCHOTIC DISORDER   

SNOMED Code(s): 38836892


   


Plan: 





We'll monitor overnight dietary changes per surgery.  Patient encouraged to 

ambulate.  We will decrease IV fluids. patient will be going to inpatient 

psychiatric admission once stable from medical site

## 2017-10-17 NOTE — P.OP
Date of Procedure: 10/17/17


Preoperative Diagnosis: 


Cholecystitis


Postoperative Diagnosis: 


Cholecystitis


Procedure(s) Performed: 


Laparoscopic cholecystectomy


Anesthesia: ELLY


Surgeon: Jeremy Sanchez


Estimated Blood Loss (ml): 5


Pathology: other (Gallbladder, cholelithiasis)


Condition: stable


Disposition: PACU


Description of Procedure: 


The patient was placed on the operating table.   The patient received a general 

endotracheal tube anesthesia.    The patients abdomen was prepped and draped 

in the usual sterile fashion.    Through an infraumbilical stab incision, the 

fascia of the anterior abdominal wall was grasped with a pair of Kochers and 

then the Veress needle was placed in the peritoneal cavity.   Position of the 

Veress needle was confirmed with positive drop test.   The abdomen was then 

insufflated.  After adequate insufflation, the 10 mm trocar was placed in the 

peritoneal cavity.    Following this the laparoscope was placed in the 

peritoneal cavity. The patient was placed in the head-up, right side up 

position and then a 5 mm trocar was placed in the right lateral and right 

subcostal position under direct visualization.    A 8 mm trocar was placed in 

the epigastric position.  The gallbladder was grasped in the fundus and 

infundibulum.  Traction  on the gallbladder was placed in the lateral and the 

cephalad positions.  The triangle of Calot  was visualized..   The cystic duct 

was bluntly dissected until the union of the cystic duct and common bile duct 

was seen.    The cystic duct was then divided and sealed with the Harmonic 

scissors.  A PDS Endoloop was then placed throughout the cystic duct stump.  

The cystic artery divided and sealed with the Harmonic scissors.   The 

gallbladder was then removed from the liver bed using Harmonic scissors.   The 

gallbladder was then extracted through the epigastric port site.  Operative 

field was checked for any bleeding spots and Harmonic scissors was used to 

coagulate the liver bed.    The abdomen was irrigated.   The trocars were 

removed.  The skin was closed using interrupted 3-0 Vicryl suture.   Dermabond 

dressing were applied.   The patient tolerated the procedure well.

## 2017-10-18 RX ADMIN — CEFAZOLIN SCH MLS/HR: 330 INJECTION, POWDER, FOR SOLUTION INTRAMUSCULAR; INTRAVENOUS at 01:43

## 2017-10-18 RX ADMIN — HYDROCODONE BITARTRATE AND ACETAMINOPHEN PRN EACH: 5; 325 TABLET ORAL at 09:50

## 2017-10-18 RX ADMIN — CEFAZOLIN SCH: 330 INJECTION, POWDER, FOR SOLUTION INTRAMUSCULAR; INTRAVENOUS at 11:37

## 2017-10-18 RX ADMIN — DOCUSATE SODIUM SCH MG: 100 CAPSULE, LIQUID FILLED ORAL at 20:45

## 2017-10-18 RX ADMIN — KETOROLAC TROMETHAMINE SCH MG: 30 INJECTION, SOLUTION INTRAMUSCULAR at 18:14

## 2017-10-18 RX ADMIN — KETOROLAC TROMETHAMINE SCH MG: 30 INJECTION, SOLUTION INTRAMUSCULAR at 11:24

## 2017-10-18 RX ADMIN — PANTOPRAZOLE SODIUM SCH MG: 40 TABLET, DELAYED RELEASE ORAL at 08:05

## 2017-10-18 RX ADMIN — CEPHALEXIN SCH MG: 500 CAPSULE ORAL at 20:45

## 2017-10-18 RX ADMIN — KETOROLAC TROMETHAMINE SCH MG: 30 INJECTION, SOLUTION INTRAMUSCULAR at 00:08

## 2017-10-18 RX ADMIN — DOCUSATE SODIUM SCH MG: 100 CAPSULE, LIQUID FILLED ORAL at 08:04

## 2017-10-18 RX ADMIN — MORPHINE SULFATE SCH MG: 30 TABLET, FILM COATED, EXTENDED RELEASE ORAL at 16:18

## 2017-10-18 RX ADMIN — CEPHALEXIN SCH MG: 500 CAPSULE ORAL at 08:04

## 2017-10-18 RX ADMIN — MORPHINE SULFATE SCH MG: 30 TABLET, FILM COATED, EXTENDED RELEASE ORAL at 00:08

## 2017-10-18 RX ADMIN — DOXAZOSIN SCH MG: 1 TABLET ORAL at 08:05

## 2017-10-18 RX ADMIN — POTASSIUM CHLORIDE SCH MEQ: 20 TABLET, EXTENDED RELEASE ORAL at 08:05

## 2017-10-18 RX ADMIN — KETOROLAC TROMETHAMINE SCH MG: 30 INJECTION, SOLUTION INTRAMUSCULAR at 05:55

## 2017-10-18 RX ADMIN — MORPHINE SULFATE SCH MG: 30 TABLET, FILM COATED, EXTENDED RELEASE ORAL at 08:05

## 2017-10-18 NOTE — P.PN
Subjective


Progress Note Date: 10/18/17





46 year male seen examined at bedside  complains of surgical pain  complain of 

no bowel movement    post op day 1 laparoscopic cholecystectomy  for 

cholecystits  





Objective





- Vital Signs


Vital signs: 


 Vital Signs











Temp  96.9 F L  10/18/17 07:00


 


Pulse  97   10/18/17 07:00


 


Resp  16   10/18/17 07:00


 


BP  119/67   10/18/17 07:00


 


Pulse Ox  96   10/18/17 07:00








 Intake & Output











 10/17/17 10/18/17 10/18/17





 18:59 06:59 18:59


 


Intake Total 2750 240 


 


Output Total 10  


 


Balance 2740 240 


 


Intake:   


 


  IV 1750  


 


  Intake, IV Titration 1000  





  Amount   


 


    Sodium Chloride 0.9% 1, 1000  





    000 ml @ 150 mls/hr IV .   





    Q6H40M [facility] Rx#:[id number]   


 


  Oral  240 


 


Output:   


 


  Estimated Blood Loss 10  


 


Other:   


 


  Voiding Method Toilet  


 


  # Voids 2 1 














- Exam





physical exam 


46-year-old woman being seen on exam resting in bed family at bedside denies 

dizziness lightheadedness chest pain or shortness of breath


Lungs essentially clear with adequate air movement no shortness of breath on 

room air


Heart S1-S2 audible regular


Abdomen surgical laparoscopic sites no redness nondistended bowel tones present 

no nausea no vomiting urinating no difficulty states no bowel movement


Extremities no edema noted





- Labs


CBC & Chem 7: 


 10/17/17 08:13





 10/17/17 08:13


Labs: 


 Abnormal Lab Results - Last 24 Hours (Table)











  10/14/17 Range/Units





  21:17 


 


Total Protein (PEP)  4.9 L  (6.2-8.2)  g/dL


 


Albumin (PEP)  2.90 L  (3.80-4.90)  g/dL


 


Gamma Globulins  0.42 L  (0.70-1.50)  g/dL








 Microbiology - Last 24 Hours (Table)











 10/13/17 14:34 Blood Culture - Preliminary





 Blood    No Growth after 96 hours


 


 10/13/17 14:35 Blood Culture - Preliminary





 Blood    No Growth after 96 hours














Assessment and Plan


Plan: 





Impression


History of schizophrenia


Sepsis likely due to UTI


Abdominal pain present on admission suspect due to cholelithiasis and Acute 

cholecystitis


Present on admission dehydration resolved


Present on admission hyponatremia


Elevated liver enzymes trending down improving


Postop laparoscopic cholecystectomy for acute cholecystitis





Plan


Dulcolax suppository 1 now


From a surgical perspective patient to be discharged defer to the timing of the 

discharge to medicine service


Continue postop surgical care


Pain control


Defer to admitting service and other consultants for medical issues


DVT and GI prophylaxis








The above impression and plan of care have been discussed and directed by 

signing physician. Tessie Leyva nurse practitioner acting as scribe for signing 

physician.

## 2017-10-18 NOTE — P.PN
Subjective


Progress Note Date: 10/18/17


Principal diagnosis: 





This 46-year-old male that was admitted secondary to delusions.  Initial workup 

showed elevation elevation of the liver enzymes and leukocytosis.  Patient 

found to have leukocytosis and urinary tract infection cultures have been 

negative and urine cultures positive for seble.





patient today complaining of pain in his abdomen.  Denies any flatus or bowel 

movement.No patient denies any fever or chills at this time denies any dysuria 

hematuria.  No nausea no vomiting 





Objective





- Vital Signs


Vital signs: 


 Vital Signs











Temp  96.9 F L  10/18/17 07:00


 


Pulse  97   10/18/17 07:00


 


Resp  16   10/18/17 07:00


 


BP  119/67   10/18/17 07:00


 


Pulse Ox  96   10/18/17 07:00








 Intake & Output











 10/17/17 10/18/17 10/18/17





 18:59 06:59 18:59


 


Intake Total 2750 240 


 


Output Total 10  


 


Balance 2740 240 


 


Intake:   


 


  IV 1750  


 


  Intake, IV Titration 1000  





  Amount   


 


    Sodium Chloride 0.9% 1, 1000  





    000 ml @ 150 mls/hr IV .   





    Q6H40M [facility] Rx#:[id number]   


 


  Oral  240 


 


Output:   


 


  Estimated Blood Loss 10  


 


Other:   


 


  Voiding Method Toilet  


 


  # Voids 2 1 














- Exam





gen:alert and oriented


lungs:clear to auscultation


heart:s1s2


abdomen:soft and depressible,non tender, evidence of site where cholecystectomy 

was done,no bleeding, bowel sounds present


ext:no edema





- Labs


CBC & Chem 7: 


 10/17/17 08:13





 10/17/17 08:13


Labs: 


 Abnormal Lab Results - Last 24 Hours (Table)











  10/14/17 Range/Units





  21:17 


 


Total Protein (PEP)  4.9 L  (6.2-8.2)  g/dL


 


Albumin (PEP)  2.90 L  (3.80-4.90)  g/dL


 


Gamma Globulins  0.42 L  (0.70-1.50)  g/dL








 Microbiology - Last 24 Hours (Table)











 10/13/17 14:34 Blood Culture - Preliminary





 Blood    No Growth after 96 hours


 


 10/13/17 14:35 Blood Culture - Preliminary





 Blood    No Growth after 96 hours














Assessment and Plan


(1) Cholecystitis


Narrative/Plan: 


Status post cholecystectomy today


patient tolerating diet, okay to discharge per surgery.


Current Visit: Yes   Status: Acute   Code(s): K81.9 - CHOLECYSTITIS, 

UNSPECIFIED   SNOMED Code(s): 45877726


   





(2) Urinary tract infection


Narrative/Plan: 


Urine cultures negative continue Keflex


no need for further antibiotic treatment upon discharge


Current Visit: Yes   Status: Acute   Code(s): N39.0 - URINARY TRACT INFECTION, 

SITE NOT SPECIFIED   SNOMED Code(s): 08175196


   





(3) Back pain


Narrative/Plan: 


Patient on morphine sulfate 30 mg 3 times a day which is his outpatient 

treatment.


Current Visit: Yes   Status: Acute   Code(s): M54.9 - DORSALGIA, UNSPECIFIED   

SNOMED Code(s): 450074111


   





(4) Acute psychosis


Narrative/Plan: 


upon review of psychiatry's notes.  There is no indication for inpatient 

admission.  I did discuss with her brother he is concerned about patient going 

back to his environment because he feels that he is going to go back to the 

same condition.  I will communicate this with the psychiatrist inform her of 

family's concerns of taking him home today.


Current Visit: Yes   Status: Acute   Code(s): F23 - BRIEF PSYCHOTIC DISORDER   

SNOMED Code(s): 12707007


   


Plan: 


discussed case with general surgery from their standpoint patient is okay to be 

discharged at this point.  We'll await discussion with psychiatry regarding 

further plans

## 2017-10-18 NOTE — P.PN
Progress Note - Text


Progress Note Date: 10/18/17


Spoke with Dr. Meneses regarding Mr. Evans.  He informed me that patient 

does report persecutory delusions about being watched and arrows being 

implanted in his head.  According to documentation, patient stated that he was 

not starving himself in a suicide attempt, but instead to see if the people 

watching him would go away.  He has not reported any thoughts of self-harm at 

this time.  Also, no reported threats of harming others.  Per discussion with 

staff, patient has been eating well on the unit and communicating effectively.  

He has refused to see psychiatry and continues to refuse to talk with me today.

  I did speak with patient's brother, informing him that I could not give him 

any information regarding patient's treatment as I do not have consent from the 

patient.  However, I did listen to any information that the brother could 

provide.  He stated that his concern is if patient's mental state will decline 

when he returns to his home.  He reported that patient's home was not in 

sanitary condition and according to Dr. Meneses, the family had to clean patient'

s home during his hospital stay.  Unfortunately, pt is not willing to undergo 

psychiatric treatment on a voluntary basis.  I do not believe he meets criteria 

at this time for admission on an involuntary basis as he is not an immediate 

danger to himself or others.  Also, it is my understanding that patient has 

been eating regularly during hospitalization and should be able to take care of 

himself.  I would recommend that patient's family explore alternative housing 

options, such as an adult foster care facility so that patient can be monitored 

more closely and soothe their concerns about him living alone.  They can find 

more information regarding placement at [email or web address].  It would be 

beneficial for patient to follow-up with outpatient psychiatric services, as 

well.  Also, the medical SW may be able to assist in that matter.  It is also 

my understanding that a second opinion was requested and another provider from 

psychiatry will evaluate patient and provide further recommendations.

## 2017-10-19 VITALS — RESPIRATION RATE: 18 BRPM

## 2017-10-19 RX ADMIN — KETOROLAC TROMETHAMINE SCH MG: 30 INJECTION, SOLUTION INTRAMUSCULAR at 06:08

## 2017-10-19 RX ADMIN — MORPHINE SULFATE SCH MG: 30 TABLET, FILM COATED, EXTENDED RELEASE ORAL at 16:15

## 2017-10-19 RX ADMIN — MORPHINE SULFATE SCH MG: 30 TABLET, FILM COATED, EXTENDED RELEASE ORAL at 00:20

## 2017-10-19 RX ADMIN — DOCUSATE SODIUM SCH MG: 100 CAPSULE, LIQUID FILLED ORAL at 20:43

## 2017-10-19 RX ADMIN — DOXAZOSIN SCH MG: 1 TABLET ORAL at 08:31

## 2017-10-19 RX ADMIN — DOCUSATE SODIUM SCH MG: 100 CAPSULE, LIQUID FILLED ORAL at 08:31

## 2017-10-19 RX ADMIN — POTASSIUM CHLORIDE SCH MEQ: 20 TABLET, EXTENDED RELEASE ORAL at 08:31

## 2017-10-19 RX ADMIN — PANTOPRAZOLE SODIUM SCH MG: 40 TABLET, DELAYED RELEASE ORAL at 08:32

## 2017-10-19 RX ADMIN — MORPHINE SULFATE SCH MG: 30 TABLET, FILM COATED, EXTENDED RELEASE ORAL at 08:31

## 2017-10-19 RX ADMIN — CEPHALEXIN SCH MG: 500 CAPSULE ORAL at 20:43

## 2017-10-19 RX ADMIN — CEPHALEXIN SCH MG: 500 CAPSULE ORAL at 08:32

## 2017-10-19 RX ADMIN — KETOROLAC TROMETHAMINE SCH MG: 30 INJECTION, SOLUTION INTRAMUSCULAR at 00:20

## 2017-10-19 NOTE — P.DS
Providers


Date of admission: 


10/13/17 13:55





Expected date of discharge: 10/19/17


Attending physician: 


Poli Miller MD





Consults: 





 





10/13/17 14:44


Consult Physician Urgent 


   Consulting Provider: Iva Cole


   Consult Reason/Comments: Psychosis


   Do you want consulting provider notified?: Yes





10/14/17 10:52


Consult Physician Routine 


   Consulting Provider: Jeremy Sanchez


   Consult Reason/Comments: cholecystitis


   Do you want consulting provider notified?: Yes





10/18/17 14:47


Consult Physician Urgent 


   Consulting Provider: Bk Hendrickson


   Consult Reason/Comments: family requested 2nd opinion


   Do you want consulting provider notified?: Already Contacted











Primary care physician: 


Raymundo Lynch








- Himanshu Diagnosis(es)


(1) Cholecystitis


Current Visit: Yes   Status: Acute   





(2) Urinary tract infection


Current Visit: Yes   Status: Acute   





(3) Back pain


Current Visit: Yes   Status: Acute   





(4) Acute psychosis


Current Visit: Yes   Status: Acute   


Hospital Course: 





This is 46-year-old male that was brought in by his family member.  This 

patient was started.  Patient with active psychosis and delusions.  Patient has 

stated that he was starting himself so that the pupils are watching him will 

let go.  He was dehydrated Coumadin.  She was vomiting every time he ate.  

Cholecystitis.  Dr. Sanchez did evaluate the patient and didn't performed 

laparoscopic cholecystectomy on 10/17/2017.  Postoperatively the patient has 

been doing well.  Tolerated.  Has had bowel movements.  No fevers.  Patient 

medically has was considered stable to be discharged.





The family had concern regarding his psychosis and delusions.  So initially Dr. Baxter had evaluated patient and he did not allow her to talk to him.  But 

she was she was able to see she did not feel that patient had any Merritts to 

be admitted to inpatient psych.  Family was concerned and asked for second 

opinion and Dr. Velasquez, and evaluate patient.  Upon his evaluation patient was 

more acceptable to being so patient will be admitted to inpatient psych at this 

time


 to the psychiatric unit.





Igen:alert and oriented


lungs:clear to auscultation


heart:s1s2


abdomen:soft and depressible,non tender


ext:no edema





I did discuss with patient with family at bedside including brother and mother.

  Patient acceptably discharged to psychiatric unit.  Patient will be evaluated 

there and treated with DrTung by Dr. Velasquez.  Initially discharge stable consult 

is Dr. Sanchez and Dr Velasquez





Discharge time 33 minutes


Patient Condition at Discharge: Stable





Plan - Discharge Summary


New Discharge Prescriptions: 


Continue


   Doxazosin [Cardura] 1 mg PO DAILY


   Morphine Sulfate [Morphine Sulfate ER] 30 mg PO TID





Discontinued


   predniSONE 12.5 mg PO DAILY


Discharge Medication List





Doxazosin [Cardura] 1 mg PO DAILY 10/13/17 [History]


Morphine Sulfate [Morphine Sulfate ER] 30 mg PO TID 10/13/17 [History]








Follow up Appointment(s)/Referral(s): 


Ryann Hensley MD [STAFF PHYSICIAN] - 11/14/17 12:45 pm


[facility], [NON-STAFF] - 


Raymundo Lynch DO [Primary Care Provider] - 1-2 days


Jeremy Sanchez MD [STAFF PHYSICIAN] - 10/20/17


Vinod Espinosa MD [REFERRING] - 1 Week


Activity/Diet/Wound Care/Special Instructions: 


no tub bath  


Shower daily


No lifting greater than 4 pounds


No driving while taking pain medication Norco


Notify surgical attending of any redness at site fever chills





Discharge Disposition: TRANSFER TO PSYCH HOSP/UNIT

## 2017-10-19 NOTE — CT
EXAMINATION TYPE: CT brain wo con

 

DATE OF EXAM: 10/19/2017

 

COMPARISON: NONE

 

HISTORY: 46-year-old male psychosis workup, confusion, status

 

TECHNIQUE:  Examination was done in axial plane without intravenous contrast.  Coronal and sagittal r
econstructions performed.

 

CT DLP: 1174 mGycm

Automated exposure control for dose reduction was used.

 

FINDINGS:

There is no evidence of  acute intracranial hemorrhage, acute ischemic changes, mass, mass-effect, or
 extra-axial fluid collection.  There is no effacement of cerebral sulci or basal subarachnoid cister
ns.  There is no hydrocephalus.  There is no midline shift.  Gray-white matter distinction is preserv
ed.

 

There is mild generalized supratentorial volume loss.

 

Paranasal sinuses and mastoid air cells well pneumatized. Leftward nasal septal deviation. Orbits and
 globes are intact.

 

 

IMPRESSION:

Mild generalized cerebral atrophy. No acute intracranial abnormality seen.

## 2017-10-19 NOTE — P.CN
Psychiatric Consult





- .


Consult date: 10/18/17 (Second Opinion)


Consult:: 

















 Vital Signs:











Temp  97.0 F L  10/18/17 15:00


 


Pulse  96   10/18/17 15:00


 


Resp  18   10/18/17 15:00


 


BP  118/55   10/18/17 15:00


 


Pulse Ox  97   10/18/17 15:00








 





Psychiatry Consult (second opinion):





Charles Evans is 46 year old  male with past psychiatric history of 

a psychotic disorder most likely schizophrenia. Prior to interview, his medical 

record was reviewed including past admission in 2014, Dr. Santos's 

evaluation and Dr. Cole's noted below. Patient consented for a brief 

evaluation. Patient is floridly delusional although he able to express now that 

while he still believes he is being monitored, and has been injected with 

probes and such he no longer has a desire to starve himself. He says because he 

no longer feels such is necessary he has been eating in the hospital. He knows 

his beliefs are not understood by others, but he is able to verbalize that 

starving himself was harmful to his physical and mental health. Patient has 

been attending to ADL's in hospital setting. For the patient to meet criteria 

for involuntary hospitalization there would have to be evidence at present that 

patient is an eminent risk to himself if he leaves the hospital. Given the 

patient has been eating and drinking well without prompt for several days this 

is not the case. Furthermore, while the patient's starvation was an action 

taken due to his psychosis, medical illness must be taken into context as well. 

When asked what happened when he tried to eat during said period of starvation, 

patient states he would vomit which he interpreted as reinforcement to his 

delusion. During this hospitalization, patient was diagnosed with chronic 

cholecystitis with cholelithiasis and after cholecystectomy patient has not 

refused any meals. It possible that this played a major contributing role in 

patient's  previous starvation and now that patient doesn't get ill eating, he 

is more likely to do so.





Of note: 52 days is ~7.5 weeks. There have been unsubstantiated reports of 

people living for up to 8-weeks without food, but for the overwhelming majority 

of the population, total starvation will be achieved in 3-4 weeks.





Psychiatry Consult:  (Dr. Cole, 10/18/2017):





Spoke with Dr. Meneses regarding Mr. Evans.  He informed me that patient 

does report persecutory delusions about being watched and arrows being 

implanted in his head.  According to documentation, patient stated that he was 

not starving himself in a suicide attempt, but instead to see if the people 

watching him would go away.  He has not reported any thoughts of self-harm at 

this time.  Also, no reported threats of harming others.  Per discussion with 

staff, patient has been eating well on the unit and communicating effectively.  

He has refused to see psychiatry and continues to refuse to talk with me today.

  I did speak with patient's brother, informing him that I could not give him 

any information regarding patient's treatment as I do not have consent from the 

patient.  However, I did listen to any information that the brother could 

provide.  He stated that his concern is if patient's mental state will decline 

when he returns to his home.  He reported that patient's home was not in 

sanitary condition and according to Dr. Meneses, the family had to clean patient'

s home during his hospital stay.  Unfortunately, pt is not willing to undergo 

psychiatric treatment on a voluntary basis.  I do not believe he meets criteria 

at this time for admission on an involuntary basis as he is not an immediate 

danger to himself or others.  Also, it is my understanding that patient has 

been eating regularly during hospitalization and should be able to take care of 

himself.  I would recommend that patient's family explore alternative housing 

options, such as an adult foster care facility so that patient can be monitored 

more closely and soothe their concerns about him living alone.  They can find 

more information regarding placement at [email or web address].  It would be 

beneficial for patient to follow-up with outpatient psychiatric services, as 

well.  Also, the medical SW may be able to assist in that matter.  It is also 

my understanding that a second opinion was requested and another provider from 

psychiatry will evaluate patient and provide further recommendations.  





Mental Status Exam:





   Appearance:  alert laying in bed, well groomed, appears stated age 


   Behavior: psychomotor agitation+++, no abnormal movements, fair eye contact


   Attitude: cooperative 


   Speech:  normal rate, rhythm,  fluency, articulation, volume, and prosody;  

primary language: English


   Mood: euthymic


   Affect: congruent, reactive


   Thought processes:  linear


   Thought content: patient does not appear to be responding to internal 

stimuli; patient denies auditory and visual hallucinations, no delusional 

behavior appreciate at current time


   Insight: fair


   Judgment: fair 


   Cognitive:  oriented to all 3 spheres, average intelligence





Recommendations:





   Agree with Dr. Cole's assessment and recommendations:





   Patient has medical decision making capacity and may consent or decline 

medical or mental health treatment despite still endorsing some delusions





   Patient does not meet criteria for involuntary admission to mental health





   Patient was offered and declined voluntary admission to mental health 





   Patient was offered trial of antipsychotic agent and declined





   Patient encouraged to establish with outpatient mental health





   Psychiatry will sign off, if you have questions or concerns please feel 

free to contact:





~





Bk Hendrickson DO


[phone] (pager)








Assessment and Plan


(1) Schizophrenia


Current Visit: Yes   Status: Acute   Code(s): F20.9 - SCHIZOPHRENIA, 

UNSPECIFIED   SNOMED Code(s): 47277158


   


Time with Patient: Greater than 30

## 2017-10-19 NOTE — P.PN
Progress Note - Text


Progress Note Date: 10/19/17





Interval History:





Patient interviewed at bedside with family x 2. Patient states that he spent 

yesterday thinking about his discussion and is willing to entertain the idea 

that at least part of his delusion of being monitored is false if imaging of 

his head is performed to rule out metal, or metal devices embedded in his 

brain. He states that if no such objects are found, he will have to concede 

that he does perhaps have a mental illness, and he states he will be more 

willing to consider treatment of such. CT of head without contrast ordered. As 

patient had no former imaging in the past and such is recommend prior to giving 

an official diagnosis of schizophrenia, such was deemed medically necessary 

During follow up with family/patient, it is reported that he was shocked at 

negative results of CT. Patient states he is willing to consent for voluntary 

admission and agreeable to trial of Invega followed by Leon Hoyos.





Mental Status Exam:





   Appearance:  alert laying in bed, well groomed, appears stated age 


   Behavior: psychomotor agitation+, no abnormal movements, fair eye contact


   Attitude: cooperative 


   Speech:  normal rate, rhythm,  fluency, articulation, volume, and prosody;  

primary language: English


   Mood: euthymic


   Affect: congruent, reactive


   Thought processes:  linear


   Thought content: patient does not appear to be responding to internal 

stimuli; patient denies auditory and visual hallucinations, no delusional 

behavior appreciate at current time


   Insight: fair


   Judgment: fair 


   Cognitive:  oriented to all 3 spheres, average intelligence





Recommendations:





   Agree with Dr. Cole's assessment and recommendations:





   Patient has medical decision making capacity and may consent or decline 

medical or mental health treatment despite still endorsing some psychotic 

symptoms





   Brief family meeting with patient and family x 2, patient requested imaging 

of his brain to rule out insertion of foreign bodies with the belief that if 

such results were negative, he would accept "that something is definitely wrong 

with my 


      mind" and consent for treatment. Given a non-contrast CT is recommended 

in the workup before a definitive diagnosis of Schizophrenia is established, CT 

brain without contrast ordered and results reviewed with patient. CT    


      negative. Patient was able to accept this, and requested help 

understanding his mental status. He endorses no AVH at present, but reports 

frequent AH that fuel his previous delusions that are now best categorized as 

"overvalued 


      ideas."





   Patient does not meet criteria for involuntary admission to mental health





   Patient was offered and accepted voluntary admission to [facility]





   Start Invega 6-mg PO QAM x 2 days then transition to Invega Sustenna 234-mg 

IM pending tolerability of oral doses





   Psychiatry will follow, if you have questions or concerns please feel free 

to contact:





~





Bk Hendrickson DO


[phone] (pager)

## 2017-10-20 ENCOUNTER — HOSPITAL ENCOUNTER (INPATIENT)
Dept: HOSPITAL 47 - 3MHU | Age: 47
LOS: 3 days | Discharge: HOME | DRG: 885 | End: 2017-10-23
Attending: PSYCHIATRY & NEUROLOGY | Admitting: PSYCHIATRY & NEUROLOGY
Payer: MEDICARE

## 2017-10-20 VITALS — SYSTOLIC BLOOD PRESSURE: 112 MMHG | TEMPERATURE: 97.4 F | HEART RATE: 96 BPM | DIASTOLIC BLOOD PRESSURE: 69 MMHG

## 2017-10-20 DIAGNOSIS — R00.0: ICD-10-CM

## 2017-10-20 DIAGNOSIS — R26.2: ICD-10-CM

## 2017-10-20 DIAGNOSIS — F50.89: ICD-10-CM

## 2017-10-20 DIAGNOSIS — Z87.891: ICD-10-CM

## 2017-10-20 DIAGNOSIS — F22: ICD-10-CM

## 2017-10-20 DIAGNOSIS — F20.9: Primary | ICD-10-CM

## 2017-10-20 DIAGNOSIS — F32.9: ICD-10-CM

## 2017-10-20 DIAGNOSIS — Z79.899: ICD-10-CM

## 2017-10-20 DIAGNOSIS — M54.9: ICD-10-CM

## 2017-10-20 DIAGNOSIS — Z90.49: ICD-10-CM

## 2017-10-20 LAB
ALP SERPL-CCNC: 386 U/L (ref 38–126)
ALT SERPL-CCNC: 29 U/L (ref 21–72)
ANION GAP SERPL CALC-SCNC: 10 MMOL/L
AST SERPL-CCNC: 19 U/L (ref 17–59)
BASOPHILS # BLD AUTO: 0 K/UL (ref 0–0.2)
BASOPHILS NFR BLD AUTO: 0 %
BUN SERPL-SCNC: 10 MG/DL (ref 9–20)
CALCIUM SPEC-MCNC: 8.4 MG/DL (ref 8.4–10.2)
CH: 28.5
CHCM: 31.7
CHLORIDE SERPL-SCNC: 106 MMOL/L (ref 98–107)
CO2 SERPL-SCNC: 23 MMOL/L (ref 22–30)
EOSINOPHIL # BLD AUTO: 0.1 K/UL (ref 0–0.7)
EOSINOPHIL NFR BLD AUTO: 2 %
ERYTHROCYTE [DISTWIDTH] IN BLOOD BY AUTOMATED COUNT: 3.69 M/UL (ref 4.3–5.9)
ERYTHROCYTE [DISTWIDTH] IN BLOOD: 14.3 % (ref 11.5–15.5)
GLUCOSE SERPL-MCNC: 136 MG/DL (ref 74–99)
HCT VFR BLD AUTO: 33.4 % (ref 39–53)
HDW: 2.93
HGB BLD-MCNC: 10.5 GM/DL (ref 13–17.5)
LUC NFR BLD AUTO: 1 %
LYMPHOCYTES # SPEC AUTO: 1.3 K/UL (ref 1–4.8)
LYMPHOCYTES NFR SPEC AUTO: 17 %
MCH RBC QN AUTO: 28.5 PG (ref 25–35)
MCHC RBC AUTO-ENTMCNC: 31.6 G/DL (ref 31–37)
MCV RBC AUTO: 90.3 FL (ref 80–100)
MONOCYTES # BLD AUTO: 0.4 K/UL (ref 0–1)
MONOCYTES NFR BLD AUTO: 5 %
NEUTROPHILS # BLD AUTO: 5.7 K/UL (ref 1.3–7.7)
NEUTROPHILS NFR BLD AUTO: 74 %
NON-AFRICAN AMERICAN GFR(MDRD): >60
POTASSIUM SERPL-SCNC: 4.3 MMOL/L (ref 3.5–5.1)
PROT SERPL-MCNC: 5.5 G/DL (ref 6.3–8.2)
SODIUM SERPL-SCNC: 139 MMOL/L (ref 137–145)
WBC # BLD AUTO: 0.11 10*3/UL
WBC # BLD AUTO: 7.7 K/UL (ref 3.8–10.6)
WBC (PEROX): 7.69

## 2017-10-20 PROCEDURE — 85025 COMPLETE CBC W/AUTO DIFF WBC: CPT

## 2017-10-20 PROCEDURE — 80053 COMPREHEN METABOLIC PANEL: CPT

## 2017-10-20 PROCEDURE — 80048 BASIC METABOLIC PNL TOTAL CA: CPT

## 2017-10-20 PROCEDURE — 84443 ASSAY THYROID STIM HORMONE: CPT

## 2017-10-20 PROCEDURE — 93005 ELECTROCARDIOGRAM TRACING: CPT

## 2017-10-20 PROCEDURE — 84439 ASSAY OF FREE THYROXINE: CPT

## 2017-10-20 PROCEDURE — 85027 COMPLETE CBC AUTOMATED: CPT

## 2017-10-20 RX ADMIN — POTASSIUM CHLORIDE SCH MEQ: 20 TABLET, EXTENDED RELEASE ORAL at 08:17

## 2017-10-20 RX ADMIN — MORPHINE SULFATE SCH MG: 30 TABLET, FILM COATED, EXTENDED RELEASE ORAL at 00:20

## 2017-10-20 RX ADMIN — PANTOPRAZOLE SODIUM SCH MG: 40 TABLET, DELAYED RELEASE ORAL at 08:17

## 2017-10-20 RX ADMIN — MORPHINE SULFATE SCH MG: 30 TABLET, FILM COATED, EXTENDED RELEASE ORAL at 16:39

## 2017-10-20 RX ADMIN — DOCUSATE SODIUM SCH MG: 100 CAPSULE, LIQUID FILLED ORAL at 21:29

## 2017-10-20 RX ADMIN — CEPHALEXIN SCH MG: 500 CAPSULE ORAL at 21:29

## 2017-10-20 RX ADMIN — CEPHALEXIN SCH MG: 500 CAPSULE ORAL at 08:17

## 2017-10-20 RX ADMIN — DOCUSATE SODIUM SCH MG: 100 CAPSULE, LIQUID FILLED ORAL at 08:17

## 2017-10-20 RX ADMIN — BISACODYL PRN MG: 5 TABLET, COATED ORAL at 08:20

## 2017-10-20 RX ADMIN — PALIPERIDONE SCH MG: 6 TABLET, EXTENDED RELEASE ORAL at 08:17

## 2017-10-20 RX ADMIN — MORPHINE SULFATE SCH MG: 30 TABLET, FILM COATED, EXTENDED RELEASE ORAL at 09:00

## 2017-10-20 RX ADMIN — DOXAZOSIN SCH MG: 1 TABLET ORAL at 08:17

## 2017-10-20 RX ADMIN — MELOXICAM SCH MG: 7.5 TABLET ORAL at 21:29

## 2017-10-20 NOTE — P.HPMEDMHU
History of Present Illness


H&P Date: 10/20/17


Chief Complaint: Hearing voices





Patient is a 46-year-old male who lives alone states he was hearing voices so 

he stopped eating in an effort quelled voices.  He states he had emesis for 

approximately 1 month was brought to the emergency room by family and admitted 

for further workup patient was determined to have cholecystitis and is 

currently status post cholecystectomy.  Once he was medically stable he was 

determined to need ongoing psychiatric treatment so he was transferred to 

psychiatry for further workup and management.  At the time of evaluation he 

denies any nausea or vomiting.  He states he has chronic back pain which limits 

his ability to move so he is primarily in a wheelchair.  He states he lost a 

significant amount of weight over 40 pounds but has since regained about 27.  

He currently denies any nausea or vomiting.





Review of Systems





Complete review of systems negative other than the stated per HPI


Constitutional: Denies chills, Denies fever





Past Medical History


Past Medical History: No Reported History (.  He looks old)


Additional Past Medical History / Comment(s): back pain


History of Any Multi-Drug Resistant Organisms: None Reported


Past Surgical History: Back Surgery, Tonsillectomy


Additional Past Surgical History / Comment(s): Cholecystectomy


Past Anesthesia/Blood Transfusion Reactions: No Reported Reaction


Past Psychological History: Depression, Schizophrenia


Smoking Status: Former smoker


Past Alcohol Use History: None Reported


Past Drug Use History: None Reported





- Past Family History


  ** Mother


Family Medical History: Rheumatoid Arthritis (RA)





  ** Father


Family Medical History: Hypertension, Skin Disorder





Medications and Allergies


 Home Medications











 Medication  Instructions  Recorded  Confirmed  Type


 


Doxazosin [Cardura] 1 mg PO DAILY 10/13/17 10/20/17 History


 


Morphine Sulfate [Morphine Sulfate 30 mg PO TID 10/13/17 10/20/17 History





ER]    











 Allergies











Allergy/AdvReac Type Severity Reaction Status Date / Time


 


No Known Allergies Allergy   Verified 10/20/17 07:24














Physical Exam


Vitals: 


 Vital Signs











  Temp Pulse Resp BP


 


 10/20/17 02:10  97.9 F  108 H  18  129/76








 Intake and Output











 10/19/17 10/20/17 10/20/17





 22:59 06:59 14:59


 


Other:   


 


  Weight  81.219 kg 














- Constitutional


General appearance: cooperative, no acute distress





- EENT


Eyes: PERRLA





- Neck


Neck: normal ROM





- Respiratory


Respiratory: negative: CTA, diminished, dullness, rales, rhonchi, wheezing, 

prolonged expiration, prolonged inspiration, other





- Cardiovascular


Rhythm: regular (facilities not coming in)


Heart sounds: normal: S1, S2





- Gastrointestinal


General gastrointestinal: soft (Appropriately tender, Steri-Strips present)





- Integumentary


Integumentary: normal, normal turgor





- Musculoskeletal


Musculoskeletal: strength equal bilaterally





- Psychiatric


Psychiatric: A&O x's 3





Cranial Nerve Examination





- Cranial Nerves


Cranial Nerve II- Optic: Intact


Cranial Nerve III- Oculomotor: Intact


Cranial Nerve IV- Trochlear: Intact


Cranial Nerve V- Trigeminal: Intact


Cranial Nerve VI- Abducens: Intact


Cranial Nerve VII- Facial: Intact


Cranial Nerve VIII- Auditory: Intact


Cranial Nerve IX- Glossopharyngeal: Intact


Cranial Nerve X- Vagus: Intact


Cranial Nerve XI- Accessory: Intact


Cranial Nerve XII- Hypoglossal: Intact





Assessment and Plan


(1) Cholecystitis


Narrative/Plan: 


Resolved, status post cholecystectomy is in any antibiotics, has completed his 

course of antibiotics surgical site appears clear and clean recommending local 

wound care they wanted to know which surgery


Current Visit: No   Status: Acute   Code(s): K81.9 - CHOLECYSTITIS, UNSPECIFIED

   SNOMED Code(s): 26071314


   





(2) Schizophrenia, chronic with acute exacerbation


Narrative/Plan: 


Continue per psychiatry


Current Visit: No   Status: Acute   Code(s): F20.9 - SCHIZOPHRENIA, UNSPECIFIED

   SNOMED Code(s): 521804135


   





(3) Back pain


Narrative/Plan: 


Chronic back pain, continue current level of activity, outpatient treatment is 

needed


Current Visit: No   Status: Acute   Code(s): M54.9 - DORSALGIA, UNSPECIFIED   

SNOMED Code(s): 643624959

## 2017-10-20 NOTE — P.HP
Psychiatric H&P





- .


H&P Date: 10/20/17


History & Physical: 


Alprazolam    1 mg    10/19/17 18:17    10/20/17 21:11


  Xanax    PO       1 mg


    QID PRN       Administration








Amitriptyline HCl    25 mg    10/19/17 22:00    10/20/17 21:09


  Elavil    PO       25 mg


    TID LAURA       Administration


   


   Allergies





Allergy/AdvReac   Type   Severity   Reaction   Status   Date / Time


No Known Allergies   Allergy         Verified   10/20/17 07:24





   Vital Signs





Temp    97.9 F     10/20/17 02:10


Pulse    108 H    10/20/17 02:10


Resp    18     10/20/17 02:10


BP    129/76     10/20/17 02:10


Pulse Ox          





   Intake & Output





   10/20/17   10/20/17   10/21/17


   06:59   18:59   06:59


Weight   81.219 kg      





   Laboratory Last Values





WBC    7.7 k/uL (3.8-10.6)     10/20/17  08:51    


RBC    3.69 m/uL (4.30-5.90)  L    10/20/17  08:51    


Hgb    10.5 gm/dL (13.0-17.5)  L    10/20/17  08:51    


Hct    33.4 % (39.0-53.0)  L    10/20/17  08:51    


MCV    90.3 fL (80.0-100.0)     10/20/17  08:51    


MCH    28.5 pg (25.0-35.0)     10/20/17  08:51    


MCHC    31.6 g/dL (31.0-37.0)     10/20/17  08:51    


RDW    14.3 % (11.5-15.5)     10/20/17  08:51    


Plt Count    394 k/uL (150-450)     10/20/17  08:51    


Neutrophils %    74 %    10/20/17  08:51    


Lymphocytes %    17 %    10/20/17  08:51    


Monocytes %    5 %    10/20/17  08:51    


Eosinophils %    2 %    10/20/17  08:51    


Basophils %    0 %    10/20/17  08:51    


Neutrophils #    5.7 k/uL (1.3-7.7)     10/20/17  08:51    


Lymphocytes #    1.3 k/uL (1.0-4.8)     10/20/17  08:51    


Monocytes #    0.4 k/uL (0-1.0)     10/20/17  08:51    


Eosinophils #    0.1 k/uL (0-0.7)     10/20/17  08:51    


Basophils #    0.0 k/uL (0-0.2)     10/20/17  08:51    


Hypochromasia    Slight     10/20/17  08:51    


Sodium    139 mmol/L (137-145)     10/20/17  08:51    


Potassium    4.3 mmol/L (3.5-5.1)     10/20/17  08:51    


Chloride    106 mmol/L ()     10/20/17  08:51    


Carbon Dioxide    23 mmol/L (22-30)     10/20/17  08:51    


Anion Gap    10 mmol/L    10/20/17  08:51    


BUN    10 mg/dL (9-20)     10/20/17  08:51    


Creatinine    0.77 mg/dL (0.66-1.25)     10/20/17  08:51    


Est GFR (MDRD) Af Amer    >60  (>60 ml/min/1.73 sqM)     10/20/17  08:51    


Est GFR (MDRD) Non-Af    >60  (>60 ml/min/1.73 sqM)     10/20/17  08:51    


Glucose    136 mg/dL (74-99)  H    10/20/17  08:51    


Calcium    8.4 mg/dL (8.4-10.2)     10/20/17  08:51    


Total Bilirubin    0.7 mg/dL (0.2-1.3)     10/20/17  08:51    


AST    19 U/L (17-59)     10/20/17  08:51    


ALT    29 U/L (21-72)     10/20/17  08:51    


Alkaline Phosphatase    386 U/L ()  H    10/20/17  08:51    


Total Protein    5.5 g/dL (6.3-8.2)  L    10/20/17  08:51    


Albumin    2.9 g/dL (3.5-5.0)  L    10/20/17  08:51    


TSH    1.160 mIU/L (0.465-4.680)     10/20/17  08:51    





 HPI: (10/20/2017)


Charles Evans is 46 year old  male with past psychiatric history 

now best categorized as schizophrenia. Patient recently had a medical 

hospitalization below after starving self and agreed to voluntary admission. He 

has been tolerating Invega well , plan is to switch to Invega SUstenna. At this 

time, patient has been participating in some groups, denies SI/HI/AVH. 





Psychiatry Consult (second opinion): (10/18/2017)





Charles Evans is 46 year old  male with past psychiatric history of 

a psychotic disorder most likely schizophrenia. Prior to interview, his medical 

record was reviewed including past admission in 2014, Dr. Santos's 

evaluation and Dr. Cole's noted below. Patient consented for a brief 

evaluation. Patient is floridly delusional although he able to express now that 

while he still believes he is being monitored, and has been injected with 

probes and such he no longer has a desire to starve himself. He says because he 

no longer feels such is necessary he has been eating in the hospital. He knows 

his beliefs are not understood by others, but he is able to verbalize that 

starving himself was harmful to his physical and mental health. Patient has 

been attending to ADL's in hospital setting. For the patient to meet criteria 

for involuntary hospitalization there would have to be evidence at present that 

patient is an eminent risk to himself if he leaves the hospital. Given the 

patient has been eating and drinking well without prompt for several days this 

is not the case. Furthermore, while the patient's starvation was an action 

taken due to his psychosis, medical illness must be taken into context as well. 

When asked what happened when he tried to eat during said period of starvation, 

patient states he would vomit which he interpreted as reinforcement to his 

delusion. During this hospitalization, patient was diagnosed with chronic 

cholecystitis with cholelithiasis and after cholecystectomy patient has not 

refused any meals. It possible that this played a major contributing role in 

patient's  previous starvation and now that patient doesn't get ill eating, he 

is more likely to do so.





Of note: 52 days is ~7.5 weeks. There have been unsubstantiated reports of 

people living for up to 8-weeks without food, but for the overwhelming majority 

of the population, total starvation will be achieved in 3-4 weeks.





Psychiatry Consult:  (Dr. Cole, 10/18/2017):





Spoke with Dr. Meneses regarding Mr. Evans.  He informed me that patient 

does report persecutory delusions about being watched and arrows being 

implanted in his head.  According to documentation, patient stated that he was 

not starving himself in a suicide attempt, but instead to see if the people 

watching him would go away.  He has not reported any thoughts of self-harm at 

this time.  Also, no reported threats of harming others.  Per discussion with 

staff, patient has been eating well on the unit and communicating effectively.  

He has refused to see psychiatry and continues to refuse to talk with me today.

  I did speak with patient's brother, informing him that I could not give him 

any information regarding patient's treatment as I do not have consent from the 

patient.  However, I did listen to any information that the brother could 

provide.  He stated that his concern is if patient's mental state will decline 

when he returns to his home.  He reported that patient's home was not in 

sanitary condition and according to Dr. Meneses, the family had to clean patient'

s home during his hospital stay.  Unfortunately, pt is not willing to undergo 

psychiatric treatment on a voluntary basis.  I do not believe he meets criteria 

at this time for admission on an involuntary basis as he is not an immediate 

danger to himself or others.  Also, it is my understanding that patient has 

been eating regularly during hospitalization and should be able to take care of 

himself.  I would recommend that patient's family explore alternative housing 

options, such as an adult foster care facility so that patient can be monitored 

more closely and soothe their concerns about him living alone.  They can find 

more information regarding placement at [email or web address].  It would be 

beneficial for patient to follow-up with outpatient psychiatric services, as 

well.  Also, the medical SW may be able to assist in that matter.  It is also 

my understanding that a second opinion was requested and another provider from 

psychiatry will evaluate patient and provide further recommendations.  





PSYCHIATRIC HISTORY: 





history of several hospitalizations, diagnosed with a psychotic disorder, off 

meds x 5+ years








PMH:





Past Medical History: No Reported History (.  He looks old)


Additional Past Medical History / Comment(s): back pain


History of Any Multi-Drug Resistant Organisms: None Reported


Past Surgical History: Back Surgery, Tonsillectomy


Additional Past Surgical History / Comment(s): Cholecystectomy


Past Anesthesia/Blood Transfusion Reactions: No Reported Reaction


Past Psychological History: Depression, Schizophrenia


Smoking Status: Former smoker


Past Alcohol Use History: None Reported


Past Drug Use History: None Reported





HOME MEDICATIONS:





 Medication    Instructions    Recorded    Confirmed


Doxazosin [Cardura]   1 mg PO DAILY   10/13/17   10/20/17


Morphine Sulfate [Morphine Sulfate   30 mg PO TID   10/13/17   10/20/17


ER]         





CHEMICAL DEPENDENCY HISTORY: 





none





STRENGTHS/WEAKNESSES: 





   supportive family, stable income, stable housing / medical frailty 





MENTAL STATUS EXAM:





   Appearance:  alert laying in bed, well groomed, appears older than 

chronological age


   Behavior: psychomotor agitation+++, no abnormal movements, fair eye contact


   Attitude: cooperative 


   Speech:  normal rate, rhythm,  fluency, articulation, volume, and prosody;  

primary language: English


   Mood: mildly anxious


   Affect: congruent, reactive


   Thought processes:  linear


   Thought content: patient does not appear to be responding to internal 

stimuli; patient denies auditory and visual hallucinations, no delusional 

behavior appreciate at current time


   Insight: fair


   Judgment: fair 


   Cognitive:  oriented to all 3 spheres, average intelligence





Assessment and Plan


(1) Schizophrenia


Narrative/Plan: 


   started on Invega 6-mg PO on 10/18/2017 tolerating well with no adverse or 

side effects





   start Invega Sustenna 234-mg IM today


Current Visit: No   Status: Acute   Priority: High   Code(s): F20.9 - 

SCHIZOPHRENIA, UNSPECIFIED   SNOMED Code(s): 37735643


   


Plan: 





   started on Invega 6-mg PO on 10/18/2017 tolerating well with no adverse or 

side effects





   start Invega Sustenna 234-mg IM today


Time with Patient: Greater than 30

## 2017-10-21 LAB
ALP SERPL-CCNC: 289 U/L (ref 38–126)
ALT SERPL-CCNC: 30 U/L (ref 21–72)
ANION GAP SERPL CALC-SCNC: 9 MMOL/L
AST SERPL-CCNC: 23 U/L (ref 17–59)
BASOPHILS # BLD AUTO: 0 K/UL (ref 0–0.2)
BASOPHILS NFR BLD AUTO: 0 %
BUN SERPL-SCNC: 13 MG/DL (ref 9–20)
CALCIUM SPEC-MCNC: 8.3 MG/DL (ref 8.4–10.2)
CH: 28.2
CHCM: 31.4
CHLORIDE SERPL-SCNC: 108 MMOL/L (ref 98–107)
CO2 SERPL-SCNC: 24 MMOL/L (ref 22–30)
EOSINOPHIL # BLD AUTO: 0.1 K/UL (ref 0–0.7)
EOSINOPHIL NFR BLD AUTO: 2 %
ERYTHROCYTE [DISTWIDTH] IN BLOOD BY AUTOMATED COUNT: 3.3 M/UL (ref 4.3–5.9)
ERYTHROCYTE [DISTWIDTH] IN BLOOD: 14.8 % (ref 11.5–15.5)
GLUCOSE SERPL-MCNC: 123 MG/DL (ref 74–99)
HCT VFR BLD AUTO: 29.8 % (ref 39–53)
HDW: 2.82
HGB BLD-MCNC: 9.2 GM/DL (ref 13–17.5)
LUC NFR BLD AUTO: 2 %
LYMPHOCYTES # SPEC AUTO: 1.3 K/UL (ref 1–4.8)
LYMPHOCYTES NFR SPEC AUTO: 18 %
MCH RBC QN AUTO: 28 PG (ref 25–35)
MCHC RBC AUTO-ENTMCNC: 31 G/DL (ref 31–37)
MCV RBC AUTO: 90.3 FL (ref 80–100)
MONOCYTES # BLD AUTO: 0.3 K/UL (ref 0–1)
MONOCYTES NFR BLD AUTO: 5 %
NEUTROPHILS # BLD AUTO: 5.4 K/UL (ref 1.3–7.7)
NEUTROPHILS NFR BLD AUTO: 74 %
NON-AFRICAN AMERICAN GFR(MDRD): >60
POTASSIUM SERPL-SCNC: 4.3 MMOL/L (ref 3.5–5.1)
PROT SERPL-MCNC: 5.1 G/DL (ref 6.3–8.2)
SODIUM SERPL-SCNC: 141 MMOL/L (ref 137–145)
WBC # BLD AUTO: 0.12 10*3/UL
WBC # BLD AUTO: 7.3 K/UL (ref 3.8–10.6)
WBC (PEROX): 7.5

## 2017-10-21 RX ADMIN — MORPHINE SULFATE SCH MG: 30 TABLET, FILM COATED, EXTENDED RELEASE ORAL at 07:58

## 2017-10-21 RX ADMIN — MELOXICAM SCH MG: 7.5 TABLET ORAL at 08:00

## 2017-10-21 RX ADMIN — MORPHINE SULFATE SCH MG: 30 TABLET, FILM COATED, EXTENDED RELEASE ORAL at 15:58

## 2017-10-21 RX ADMIN — PALIPERIDONE SCH MG: 6 TABLET, EXTENDED RELEASE ORAL at 08:01

## 2017-10-21 RX ADMIN — MORPHINE SULFATE SCH MG: 30 TABLET, FILM COATED, EXTENDED RELEASE ORAL at 00:47

## 2017-10-21 RX ADMIN — MELOXICAM SCH MG: 7.5 TABLET ORAL at 21:21

## 2017-10-21 RX ADMIN — POTASSIUM CHLORIDE SCH MEQ: 20 TABLET, EXTENDED RELEASE ORAL at 08:01

## 2017-10-21 RX ADMIN — DOCUSATE SODIUM SCH MG: 100 CAPSULE, LIQUID FILLED ORAL at 21:22

## 2017-10-21 RX ADMIN — CEPHALEXIN SCH MG: 500 CAPSULE ORAL at 07:59

## 2017-10-21 RX ADMIN — DOCUSATE SODIUM SCH MG: 100 CAPSULE, LIQUID FILLED ORAL at 08:00

## 2017-10-21 RX ADMIN — DOXAZOSIN SCH MG: 1 TABLET ORAL at 08:00

## 2017-10-21 RX ADMIN — CEPHALEXIN SCH MG: 500 CAPSULE ORAL at 21:22

## 2017-10-21 RX ADMIN — BISACODYL PRN MG: 5 TABLET, COATED ORAL at 08:01

## 2017-10-21 RX ADMIN — PANTOPRAZOLE SODIUM SCH MG: 40 TABLET, DELAYED RELEASE ORAL at 07:58

## 2017-10-22 LAB
ANION GAP SERPL CALC-SCNC: 9 MMOL/L
BUN SERPL-SCNC: 13 MG/DL (ref 9–20)
CALCIUM SPEC-MCNC: 8.3 MG/DL (ref 8.4–10.2)
CH: 28.3
CHCM: 31.5
CHLORIDE SERPL-SCNC: 108 MMOL/L (ref 98–107)
CO2 SERPL-SCNC: 23 MMOL/L (ref 22–30)
ERYTHROCYTE [DISTWIDTH] IN BLOOD BY AUTOMATED COUNT: 3.02 M/UL (ref 4.3–5.9)
ERYTHROCYTE [DISTWIDTH] IN BLOOD: 15.1 % (ref 11.5–15.5)
GLUCOSE SERPL-MCNC: 110 MG/DL (ref 74–99)
HCT VFR BLD AUTO: 27.3 % (ref 39–53)
HDW: 2.78
HGB BLD-MCNC: 8.4 GM/DL (ref 13–17.5)
MCH RBC QN AUTO: 27.9 PG (ref 25–35)
MCHC RBC AUTO-ENTMCNC: 30.8 G/DL (ref 31–37)
MCV RBC AUTO: 90.5 FL (ref 80–100)
NON-AFRICAN AMERICAN GFR(MDRD): >60
POTASSIUM SERPL-SCNC: 4.6 MMOL/L (ref 3.5–5.1)
SODIUM SERPL-SCNC: 140 MMOL/L (ref 137–145)
WBC # BLD AUTO: 7.6 K/UL (ref 3.8–10.6)

## 2017-10-22 RX ADMIN — CIPROFLOXACIN HYDROCHLORIDE SCH DROPS: 3 SOLUTION/ DROPS OPHTHALMIC at 23:51

## 2017-10-22 RX ADMIN — POTASSIUM CHLORIDE SCH MEQ: 20 TABLET, EXTENDED RELEASE ORAL at 08:19

## 2017-10-22 RX ADMIN — DOCUSATE SODIUM SCH MG: 100 CAPSULE, LIQUID FILLED ORAL at 21:45

## 2017-10-22 RX ADMIN — CIPROFLOXACIN HYDROCHLORIDE SCH DROPS: 3 SOLUTION/ DROPS OPHTHALMIC at 21:45

## 2017-10-22 RX ADMIN — PANTOPRAZOLE SODIUM SCH MG: 40 TABLET, DELAYED RELEASE ORAL at 08:19

## 2017-10-22 RX ADMIN — MORPHINE SULFATE SCH MG: 30 TABLET, FILM COATED, EXTENDED RELEASE ORAL at 08:19

## 2017-10-22 RX ADMIN — CEPHALEXIN SCH MG: 500 CAPSULE ORAL at 21:45

## 2017-10-22 RX ADMIN — MORPHINE SULFATE SCH MG: 30 TABLET, FILM COATED, EXTENDED RELEASE ORAL at 23:50

## 2017-10-22 RX ADMIN — DOCUSATE SODIUM SCH MG: 100 CAPSULE, LIQUID FILLED ORAL at 08:20

## 2017-10-22 RX ADMIN — PALIPERIDONE SCH MG: 6 TABLET, EXTENDED RELEASE ORAL at 08:20

## 2017-10-22 RX ADMIN — MORPHINE SULFATE SCH MG: 30 TABLET, FILM COATED, EXTENDED RELEASE ORAL at 16:06

## 2017-10-22 RX ADMIN — CEPHALEXIN SCH MG: 500 CAPSULE ORAL at 08:19

## 2017-10-22 RX ADMIN — MELOXICAM SCH: 7.5 TABLET ORAL at 21:45

## 2017-10-22 RX ADMIN — DOXAZOSIN SCH MG: 1 TABLET ORAL at 08:17

## 2017-10-22 RX ADMIN — MELOXICAM SCH MG: 7.5 TABLET ORAL at 08:17

## 2017-10-22 RX ADMIN — MORPHINE SULFATE SCH MG: 30 TABLET, FILM COATED, EXTENDED RELEASE ORAL at 00:22

## 2017-10-22 NOTE — P.PN
Progress Note - Text


Progress Note Date: 10/21/17











 Vital Signs:











Temp  98.2 F   10/22/17 00:23


 


Pulse  126 H  10/22/17 16:10


 


Resp  14   10/22/17 15:15


 


BP  137/74   10/22/17 16:10


 


Pulse Ox      








 Iintake & Output:











 10/21/17 10/22/17 10/22/17





 18:59 06:59 18:59


 


Weight 81.193 kg  87.5 kg








 Laboratory Results:











  10/21/17 10/21/17





  18:36 18:36


 


WBC  7.3 


 


RBC  3.30 L 


 


Hgb  9.2 L 


 


Hct  29.8 L 


 


MCV  90.3 


 


MCH  28.0 


 


MCHC  31.0 


 


RDW  14.8 


 


Plt Count  432 


 


Neutrophils %  74 


 


Lymphocytes %  18 


 


Monocytes %  5 


 


Eosinophils %  2 


 


Basophils %  0 


 


Neutrophils #  5.4 


 


Lymphocytes #  1.3 


 


Monocytes #  0.3 


 


Eosinophils #  0.1 


 


Basophils #  0.0 


 


Hypochromasia  Slight 


 


Sodium   141


 


Potassium   4.3


 


Chloride   108 H


 


Carbon Dioxide   24


 


Anion Gap   9


 


BUN   13


 


Creatinine   0.80


 


Est GFR (MDRD) Af Amer   >60


 


Est GFR (MDRD) Non-Af   >60


 


Glucose   123 H


 


Calcium   8.3 L


 


Total Bilirubin   0.5


 


AST   23


 


ALT   30


 


Alkaline Phosphatase   289 H


 


Total Protein   5.1 L


 


Albumin   2.7 L





Interval History:





Patient interviewed on unit. Patient continues to have difficulty ambulating 

with cane and has been using his wheelchair to traverse the unit. Patient's 

physical health continues to improve but he is still sickly at times. Patient 

continues to be floridly delusional but remains no danger to self or others.





Mental Status Exam:





   Appearance:  alert, hygiene intact, appears stated age, steady gait 


   Behavior: psychomotor agitation+++m no abnormal movements, fair eye contact


   Attitude: cooperative 


   Speech:  normal rate, rhythm,  fluency, articulation, volume, and prosody;  

primary language: English


   Mood: euthymic


   Affect: congruent, reactive


   Thought processes:  linear, organized


   Thought content: patient does not appear to be responding to internal 

stimuli; patient denies auditory and visual hallucinations, no delusions 

appreciated at this time although were reported by staff 


   Insight: fair


   Judgment: fair 


   Cognitive:  oriented to all 3 spheres, average intelligence





   Plan:





   Continue hospitalization, provisional discharge 10/23/2017





   Patient received Invega Sustenna 234-mg IM on 10/21/2017





      No adverse reactions or side effects to medication or at injection site





      Consider PT/OT consults, will discuss with Medicine





   





      





      





      





      





      





      





      





      











us

## 2017-10-22 NOTE — P.PN
Progress Note - Text


Progress Note Date: 10/22/17








Vital Signs:





Temp    98.2 F     10/22/17 00:23


Pulse    126 H    10/22/17 16:10


Resp    14     10/22/17 15:15


BP    137/74     10/22/17 16:10


Pulse Ox          





Intake & Output:





   10/21/17   10/22/17   10/22/17


   18:59   06:59   18:59


Weight   81.193 kg      87.5 kg





Laboratory Results:





    10/21/17   10/21/17


    18:36   18:36


WBC    7.3   


RBC    3.30 L   


Hgb    9.2 L   


Hct    29.8 L   


MCV    90.3   


MCH    28.0   


MCHC    31.0   


RDW    14.8   


Plt Count    432   


Neutrophils %    74   


Lymphocytes %    18   


Monocytes %    5   


Eosinophils %    2   


Basophils %    0   


Neutrophils #    5.4   


Lymphocytes #    1.3   


Monocytes #    0.3   


Eosinophils #    0.1   


Basophils #    0.0   


Hypochromasia    Slight   


Sodium       141


Potassium       4.3


Chloride       108 H


Carbon Dioxide       24


Anion Gap       9


BUN       13


Creatinine       0.80


Est GFR (MDRD) Af Amer       >60


Est GFR (MDRD) Non-Af       >60


Glucose       123 H


Calcium       8.3 L


Total Bilirubin       0.5


AST       23


ALT       30


Alkaline Phosphatase       289 H


Total Protein       5.1 L


Albumin       2.7 L





Interval History:





Patient interviewed privately at bedside. He reports no notable response from 

Invega Soha but denies any negative or adverse reaction. Patient appears 

and sounds dismayed. Reminded patient that it will take the medicine a few 

weeks to reach full effect. Patient voiced understanding. New complaint of left 

ear pain and minor swelling on left but not right side of  face. Face on left 

side is slightly erythematous, slightly swollen.   





Collateral: 





Mack Evans ([phone])





Patient's brother called unit and requested to speak with attending. Brother 

expressed concerns about patient supervision post discharge. Discussed with 

brother that patient was going to be setup for home health, home PT, and home 

OT as he transitions back into independent living. Brother was comfortable with 

this relay of updates. No other concerns voiced. 





Mental Status Exam:





   Appearance:  alert, hygiene intact, appears stated age, steady gait 


   Behavior: psychomotor agitation+++m no abnormal movements, fair eye contact


   Attitude: cooperative 


   Speech:  normal rate, rhythm, fluency, articulation, volume, and prosody; 

primary language: English


   Mood: anxious


   Affect: congruent, reactive


   Thought processes:  linear, organized


   Thought content: patient does not appear to be responding to internal 

stimuli; patient denies auditory and visual hallucinations, no delusions 

appreciated at this time although were reported by staff 


   Insight: fair


   Judgment: fair 


   Cognitive:  oriented to all 3 spheres, average intelligence





Plan:





   Continue hospitalization, provisional discharge 10/23/2017


   


   Patient received Invega Sustenna 234-mg IM on 10/21/2017


   


   No adverse reactions or side effects to medication or at injection site


   


   PT/OT consulted, will discuss with Medicine


   


   Medicine contacted for otoscopic exam of left ear

## 2017-10-22 NOTE — P.PN
Progress Note - Text


Progress Note Date: 10/22/17





Called to evaluate Mr Evans. Patient had been noticed to have tachycardia. 

Reviewed EKG which showed sinus tachycardia previous EKG unavilable.


Patient says he has ear pain. Feels like left side heavy. lT EYE IS And when 

the toilet is flushed sounds very loud. Will check CBC,BMP, THRYOID PROFILE.


Lt eyelid feels swollen. Ear exam showed no signs of infection. Will order 

cipro eye drops.

## 2017-10-23 VITALS
HEART RATE: 117 BPM | TEMPERATURE: 98.4 F | SYSTOLIC BLOOD PRESSURE: 104 MMHG | DIASTOLIC BLOOD PRESSURE: 59 MMHG | RESPIRATION RATE: 20 BRPM

## 2017-10-23 LAB
BASOPHILS # BLD AUTO: 0 K/UL (ref 0–0.2)
BASOPHILS # BLD AUTO: 0 K/UL (ref 0–0.2)
BASOPHILS NFR BLD AUTO: 0 %
BASOPHILS NFR BLD AUTO: 0 %
CH: 28.4
CH: 29.6
CHCM: 31.1
CHCM: 32.2
EOSINOPHIL # BLD AUTO: 0.1 K/UL (ref 0–0.7)
EOSINOPHIL # BLD AUTO: 0.2 K/UL (ref 0–0.7)
EOSINOPHIL NFR BLD AUTO: 1 %
EOSINOPHIL NFR BLD AUTO: 2 %
ERYTHROCYTE [DISTWIDTH] IN BLOOD BY AUTOMATED COUNT: 3.21 M/UL (ref 4.3–5.9)
ERYTHROCYTE [DISTWIDTH] IN BLOOD BY AUTOMATED COUNT: 3.3 M/UL (ref 4.3–5.9)
ERYTHROCYTE [DISTWIDTH] IN BLOOD: 15.1 % (ref 11.5–15.5)
ERYTHROCYTE [DISTWIDTH] IN BLOOD: 16.4 % (ref 11.5–15.5)
HCT VFR BLD AUTO: 29.7 % (ref 39–53)
HCT VFR BLD AUTO: 30.3 % (ref 39–53)
HDW: 2.74
HDW: 2.8
HGB BLD-MCNC: 9.2 GM/DL (ref 13–17.5)
HGB BLD-MCNC: 9.4 GM/DL (ref 13–17.5)
LUC NFR BLD AUTO: 1 %
LUC NFR BLD AUTO: 2 %
LYMPHOCYTES # SPEC AUTO: 1.7 K/UL (ref 1–4.8)
LYMPHOCYTES # SPEC AUTO: 1.8 K/UL (ref 1–4.8)
LYMPHOCYTES NFR SPEC AUTO: 20 %
LYMPHOCYTES NFR SPEC AUTO: 21 %
MCH RBC QN AUTO: 28.4 PG (ref 25–35)
MCH RBC QN AUTO: 28.8 PG (ref 25–35)
MCHC RBC AUTO-ENTMCNC: 30.9 G/DL (ref 31–37)
MCHC RBC AUTO-ENTMCNC: 31.1 G/DL (ref 31–37)
MCV RBC AUTO: 91.7 FL (ref 80–100)
MCV RBC AUTO: 92.4 FL (ref 80–100)
MONOCYTES # BLD AUTO: 0.4 K/UL (ref 0–1)
MONOCYTES # BLD AUTO: 0.5 K/UL (ref 0–1)
MONOCYTES NFR BLD AUTO: 5 %
MONOCYTES NFR BLD AUTO: 5 %
NEUTROPHILS # BLD AUTO: 5.8 K/UL (ref 1.3–7.7)
NEUTROPHILS # BLD AUTO: 6.6 K/UL (ref 1.3–7.7)
NEUTROPHILS NFR BLD AUTO: 71 %
NEUTROPHILS NFR BLD AUTO: 71 %
WBC # BLD AUTO: 0.11 10*3/UL
WBC # BLD AUTO: 0.14 10*3/UL
WBC # BLD AUTO: 8.1 K/UL (ref 3.8–10.6)
WBC # BLD AUTO: 9.3 K/UL (ref 3.8–10.6)
WBC (PEROX): 8.32
WBC (PEROX): 9.54

## 2017-10-23 RX ADMIN — CIPROFLOXACIN HYDROCHLORIDE SCH DROPS: 3 SOLUTION/ DROPS OPHTHALMIC at 11:42

## 2017-10-23 RX ADMIN — DOCUSATE SODIUM SCH MG: 100 CAPSULE, LIQUID FILLED ORAL at 08:35

## 2017-10-23 RX ADMIN — CIPROFLOXACIN HYDROCHLORIDE SCH: 3 SOLUTION/ DROPS OPHTHALMIC at 06:47

## 2017-10-23 RX ADMIN — DOXAZOSIN SCH MG: 1 TABLET ORAL at 08:32

## 2017-10-23 RX ADMIN — PANTOPRAZOLE SODIUM SCH MG: 40 TABLET, DELAYED RELEASE ORAL at 08:34

## 2017-10-23 RX ADMIN — CEPHALEXIN SCH MG: 500 CAPSULE ORAL at 08:35

## 2017-10-23 RX ADMIN — POTASSIUM CHLORIDE SCH MEQ: 20 TABLET, EXTENDED RELEASE ORAL at 08:35

## 2017-10-23 RX ADMIN — CIPROFLOXACIN HYDROCHLORIDE SCH DROPS: 3 SOLUTION/ DROPS OPHTHALMIC at 08:32

## 2017-10-23 RX ADMIN — MORPHINE SULFATE SCH MG: 30 TABLET, FILM COATED, EXTENDED RELEASE ORAL at 08:33

## 2018-05-09 ENCOUNTER — HOSPITAL ENCOUNTER (EMERGENCY)
Dept: HOSPITAL 47 - EC | Age: 48
Discharge: HOME | End: 2018-05-09
Payer: MEDICARE

## 2018-05-09 VITALS — RESPIRATION RATE: 16 BRPM

## 2018-05-09 VITALS — TEMPERATURE: 98.8 F

## 2018-05-09 VITALS — SYSTOLIC BLOOD PRESSURE: 134 MMHG | DIASTOLIC BLOOD PRESSURE: 74 MMHG | HEART RATE: 62 BPM

## 2018-05-09 DIAGNOSIS — F20.0: Primary | ICD-10-CM

## 2018-05-09 DIAGNOSIS — Z82.49: ICD-10-CM

## 2018-05-09 DIAGNOSIS — Z88.5: ICD-10-CM

## 2018-05-09 DIAGNOSIS — Z87.891: ICD-10-CM

## 2018-05-09 LAB
ALBUMIN SERPL-MCNC: 4.7 G/DL (ref 3.5–5)
ALP SERPL-CCNC: 157 U/L (ref 38–126)
ALT SERPL-CCNC: 39 U/L (ref 21–72)
ANION GAP SERPL CALC-SCNC: 15 MMOL/L
APTT BLD: 24.5 SEC (ref 22–30)
AST SERPL-CCNC: 29 U/L (ref 17–59)
BASOPHILS # BLD AUTO: 0.1 K/UL (ref 0–0.2)
BASOPHILS NFR BLD AUTO: 1 %
BUN SERPL-SCNC: 21 MG/DL (ref 9–20)
CALCIUM SPEC-MCNC: 9.4 MG/DL (ref 8.4–10.2)
CHLORIDE SERPL-SCNC: 105 MMOL/L (ref 98–107)
CK SERPL-CCNC: 87 U/L (ref 55–170)
CO2 SERPL-SCNC: 24 MMOL/L (ref 22–30)
EOSINOPHIL # BLD AUTO: 0.1 K/UL (ref 0–0.7)
EOSINOPHIL NFR BLD AUTO: 1 %
ERYTHROCYTE [DISTWIDTH] IN BLOOD BY AUTOMATED COUNT: 4.53 M/UL (ref 4.3–5.9)
ERYTHROCYTE [DISTWIDTH] IN BLOOD: 14.2 % (ref 11.5–15.5)
GLUCOSE SERPL-MCNC: 98 MG/DL (ref 74–99)
HCT VFR BLD AUTO: 40.1 % (ref 39–53)
HGB BLD-MCNC: 13.5 GM/DL (ref 13–17.5)
INR PPP: 0.9 (ref ?–1.2)
LYMPHOCYTES # SPEC AUTO: 1.8 K/UL (ref 1–4.8)
LYMPHOCYTES NFR SPEC AUTO: 19 %
MAGNESIUM SPEC-SCNC: 2.3 MG/DL (ref 1.6–2.3)
MCH RBC QN AUTO: 29.9 PG (ref 25–35)
MCHC RBC AUTO-ENTMCNC: 33.8 G/DL (ref 31–37)
MCV RBC AUTO: 88.5 FL (ref 80–100)
MONOCYTES # BLD AUTO: 0.4 K/UL (ref 0–1)
MONOCYTES NFR BLD AUTO: 4 %
NEUTROPHILS # BLD AUTO: 6.9 K/UL (ref 1.3–7.7)
NEUTROPHILS NFR BLD AUTO: 74 %
PLATELET # BLD AUTO: 352 K/UL (ref 150–450)
POTASSIUM SERPL-SCNC: 4.1 MMOL/L (ref 3.5–5.1)
PROT SERPL-MCNC: 6.9 G/DL (ref 6.3–8.2)
PT BLD: 9.4 SEC (ref 9–12)
SODIUM SERPL-SCNC: 144 MMOL/L (ref 137–145)
TROPONIN I SERPL-MCNC: <0.012 NG/ML (ref 0–0.03)
WBC # BLD AUTO: 9.3 K/UL (ref 3.8–10.6)

## 2018-05-09 PROCEDURE — 80053 COMPREHEN METABOLIC PANEL: CPT

## 2018-05-09 PROCEDURE — 93005 ELECTROCARDIOGRAM TRACING: CPT

## 2018-05-09 PROCEDURE — 99285 EMERGENCY DEPT VISIT HI MDM: CPT

## 2018-05-09 PROCEDURE — 85610 PROTHROMBIN TIME: CPT

## 2018-05-09 PROCEDURE — 71046 X-RAY EXAM CHEST 2 VIEWS: CPT

## 2018-05-09 PROCEDURE — 80306 DRUG TEST PRSMV INSTRMNT: CPT

## 2018-05-09 PROCEDURE — 82550 ASSAY OF CK (CPK): CPT

## 2018-05-09 PROCEDURE — 85730 THROMBOPLASTIN TIME PARTIAL: CPT

## 2018-05-09 PROCEDURE — 84484 ASSAY OF TROPONIN QUANT: CPT

## 2018-05-09 PROCEDURE — 83735 ASSAY OF MAGNESIUM: CPT

## 2018-05-09 PROCEDURE — 36415 COLL VENOUS BLD VENIPUNCTURE: CPT

## 2018-05-09 PROCEDURE — 85025 COMPLETE CBC W/AUTO DIFF WBC: CPT

## 2018-05-09 PROCEDURE — 82553 CREATINE MB FRACTION: CPT

## 2018-05-09 NOTE — XR
EXAMINATION TYPE: XR chest 2V

 

DATE OF EXAM: 5/9/2018

 

COMPARISON: 10/13/2017

 

TECHNIQUE: PA and lateral views submitted.

 

HISTORY: Dysrhythmia

 

FINDINGS:

The lungs are clear and  there is no pneumothorax, pleural effusion, or focal pneumonia.  

 

IMPRESSION: 

1. No acute process.

## 2018-05-09 NOTE — ED
General Adult HPI





- General


Chief complaint: Arrhythmia/Palpitations


Stated complaint: Arrhythmia


Time Seen by Provider: 05/09/18 08:15


Source: patient, RN notes reviewed


Mode of arrival: wheelchair


Limitations: no limitations





- History of Present Illness


Initial comments: 





This is a 47-year-old male who presents emergency Department who has a past 

medical history significant for schizophrenia and chronic back pain.  Patient 

comes in today stating that he's been off his schizophrenic medicines for a few 

months and today he was feeling a buzzing occasionally his heart like a cell 

phone ringing.  Patient states he had come in because the voices in his head 

were telling them in.  Brothers with the patient and brother states the patient 

is safe he is just having some increased hallucinations and he has an 

appointment Tuesday to see a psychiatrist to go back on the medications.  

Patient denies any chest pain palpitations difficulty breathing or shortness of 

breath.  Patient denies any abdominal pain patient denies nausea vomiting or 

diarrhea.  Patient denies any recent fever chills or cough.  Patient denies any 

swelling to the legs or calf tenderness.  Patient admits he thinks it's just in 

his head but he wanted to reassure himself by come to the emergency department.





- Related Data


 Previous Rx's











 Medication  Instructions  Recorded


 


Morphine Sulfate ER [Ms Contin] 30 mg PO TID@0001,0800,1600 #42 10/23/17





 tablet 











 Allergies











Allergy/AdvReac Type Severity Reaction Status Date / Time


 


methadone AdvReac  Swelling Verified 05/09/18 08:50














Review of Systems


ROS Statement: 


Those systems with pertinent positive or pertinent negative responses have been 

documented in the HPI.





ROS Other: All systems not noted in ROS Statement are negative.





Past Medical History


Past Medical History: No Reported History


Additional Past Medical History / Comment(s): back pain, gall bladder


History of Any Multi-Drug Resistant Organisms: None Reported


Past Surgical History: Back Surgery, Tonsillectomy


Additional Past Surgical History / Comment(s): Cholecystectomy


Past Anesthesia/Blood Transfusion Reactions: No Reported Reaction


Past Psychological History: Depression, Schizophrenia


Smoking Status: Former smoker


Past Alcohol Use History: None Reported


Past Drug Use History: None Reported





- Past Family History


  ** Mother


Family Medical History: Pulmonary Embolus, Rheumatoid Arthritis (RA)





  ** Father


Family Medical History: Hypertension, Pulmonary Embolus, Skin Disorder





General Exam





- General Exam Comments


Initial Comments: 





GENERAL:


Patient is well-developed and well-nourished.  Patient is nontoxic and well-

hydrated and is in mild distress.





ENT:


Neck is soft and supple.  No significant lymphadenopathy is noted.  Oropharynx 

is clear.  Moist mucous membranes.  Neck has full range of motion without 

eliciting any pain.  





EYES:


The sclera were anicteric and conjunctiva were pink and moist.  Extraocular 

movements were intact and pupils were equal round and reactive to light.  

Eyelids were unremarkable.





PULMONARY:


Unlabored respirations.  Good breath sounds bilaterally.  No audible rales 

rhonchi or wheezing was noted.





CARDIOVASCULAR:


There is a regular rate and rhythm without any murmurs gallops or rubs.  

Patient stated that the vibrations were going on when I was listening to him 

and looking at the monitor and there was no abnormality noted





ABDOMEN:


Soft and nontender with normal bowel sounds.  No palpable organomegaly was 

noted.  There is no palpable pulsatile mass.





SKIN:


Skin is clear with no lesions or rashes and otherwise unremarkable.





NEUROLOGIC:


Patient is alert and oriented x3.  Cranial nerves II through XII are grossly 

intact.  Motor and sensory are also intact.  Normal speech, volume and content.

  Symmetrical smile.  





MUSCULOSKELETAL:


Normal extremities with adequate strength and full range of motion.  No lower 

extremity swelling or edema.  No calf tenderness.





LYMPHATICS:


No significant lymphadenopathy is noted





PSYCHIATRIC:


Normal psychiatric evaluation. 


Limitations: no limitations





Course


 Vital Signs











  05/09/18 05/09/18





  08:16 08:47


 


Temperature 98.8 F 


 


Pulse Rate 75 65


 


Respiratory 18 16





Rate  


 


Blood Pressure 209/86 167/83


 


O2 Sat by Pulse 96 100





Oximetry  














Medical Decision Making





- Medical Decision Making





EKG shows normal sinus rhythm at 60 bpm MT interval is 136 dresses 98 QT 

interval 418 QTC is 418.  Patient's EKG shows no ST segment elevation or 

depression or T wave abnormalities are noted





Chest x-ray shows no acute abnormality





Patient Thinking he felt a vibration in his chest and when it occurred 

eliciting to his heart there was no change in the greater rhythm I looked at 

the monitor there was no change in the monitor.  Patient then concluded that 

this is probably secondary to schizophrenia.  Brother has an appointment for 

Monday to say which she will be taking him to.





- Lab Data


Result diagrams: 


 05/09/18 08:40





 05/09/18 08:40


 Lab Results











  05/09/18 05/09/18 05/09/18 Range/Units





  08:40 08:40 08:40 


 


WBC   9.3   (3.8-10.6)  k/uL


 


RBC   4.53   (4.30-5.90)  m/uL


 


Hgb   13.5   (13.0-17.5)  gm/dL


 


Hct   40.1   (39.0-53.0)  %


 


MCV   88.5   (80.0-100.0)  fL


 


MCH   29.9   (25.0-35.0)  pg


 


MCHC   33.8   (31.0-37.0)  g/dL


 


RDW   14.2   (11.5-15.5)  %


 


Plt Count   352   (150-450)  k/uL


 


Neutrophils %   74   %


 


Lymphocytes %   19   %


 


Monocytes %   4   %


 


Eosinophils %   1   %


 


Basophils %   1   %


 


Neutrophils #   6.9   (1.3-7.7)  k/uL


 


Lymphocytes #   1.8   (1.0-4.8)  k/uL


 


Monocytes #   0.4   (0-1.0)  k/uL


 


Eosinophils #   0.1   (0-0.7)  k/uL


 


Basophils #   0.1   (0-0.2)  k/uL


 


PT     (9.0-12.0)  sec


 


INR     (<1.2)  


 


APTT     (22.0-30.0)  sec


 


Sodium    144  (137-145)  mmol/L


 


Potassium    4.1  (3.5-5.1)  mmol/L


 


Chloride    105  ()  mmol/L


 


Carbon Dioxide    24  (22-30)  mmol/L


 


Anion Gap    15  mmol/L


 


BUN    21 H  (9-20)  mg/dL


 


Creatinine    0.98  (0.66-1.25)  mg/dL


 


Est GFR (CKD-EPI)AfAm    >90  (>60 ml/min/1.73 sqM)  


 


Est GFR (CKD-EPI)NonAf    >90  (>60 ml/min/1.73 sqM)  


 


Glucose    98  (74-99)  mg/dL


 


Calcium    9.4  (8.4-10.2)  mg/dL


 


Magnesium    2.3  (1.6-2.3)  mg/dL


 


Total Bilirubin    0.5  (0.2-1.3)  mg/dL


 


AST    29  (17-59)  U/L


 


ALT    39  (21-72)  U/L


 


Alkaline Phosphatase    157 H  ()  U/L


 


Total Creatine Kinase  87    ()  U/L


 


CK-MB (CK-2)  <0.2    (0.0-2.4)  ng/mL


 


CK-MB (CK-2) Rel Index      


 


Troponin I  <0.012    (0.000-0.034)  ng/mL


 


Total Protein    6.9  (6.3-8.2)  g/dL


 


Albumin    4.7  (3.5-5.0)  g/dL


 


Urine Opiates Screen     (NotDetected)  


 


Ur Oxycodone Screen     (NotDetected)  


 


Urine Methadone Screen     (NotDetected)  


 


Ur Propoxyphene Screen     (NotDetected)  


 


Ur Barbiturates Screen     (NotDetected)  


 


U Tricyclic Antidepress     (NotDetected)  


 


Ur Phencyclidine Scrn     (NotDetected)  


 


Ur Amphetamines Screen     (NotDetected)  


 


U Methamphetamines Scrn     (NotDetected)  


 


U Benzodiazepines Scrn     (NotDetected)  


 


Urine Cocaine Screen     (NotDetected)  


 


U Marijuana (THC) Screen     (NotDetected)  














  05/09/18 05/09/18 Range/Units





  08:40 08:40 


 


WBC    (3.8-10.6)  k/uL


 


RBC    (4.30-5.90)  m/uL


 


Hgb    (13.0-17.5)  gm/dL


 


Hct    (39.0-53.0)  %


 


MCV    (80.0-100.0)  fL


 


MCH    (25.0-35.0)  pg


 


MCHC    (31.0-37.0)  g/dL


 


RDW    (11.5-15.5)  %


 


Plt Count    (150-450)  k/uL


 


Neutrophils %    %


 


Lymphocytes %    %


 


Monocytes %    %


 


Eosinophils %    %


 


Basophils %    %


 


Neutrophils #    (1.3-7.7)  k/uL


 


Lymphocytes #    (1.0-4.8)  k/uL


 


Monocytes #    (0-1.0)  k/uL


 


Eosinophils #    (0-0.7)  k/uL


 


Basophils #    (0-0.2)  k/uL


 


PT  9.4   (9.0-12.0)  sec


 


INR  0.9   (<1.2)  


 


APTT  24.5   (22.0-30.0)  sec


 


Sodium    (137-145)  mmol/L


 


Potassium    (3.5-5.1)  mmol/L


 


Chloride    ()  mmol/L


 


Carbon Dioxide    (22-30)  mmol/L


 


Anion Gap    mmol/L


 


BUN    (9-20)  mg/dL


 


Creatinine    (0.66-1.25)  mg/dL


 


Est GFR (CKD-EPI)AfAm    (>60 ml/min/1.73 sqM)  


 


Est GFR (CKD-EPI)NonAf    (>60 ml/min/1.73 sqM)  


 


Glucose    (74-99)  mg/dL


 


Calcium    (8.4-10.2)  mg/dL


 


Magnesium    (1.6-2.3)  mg/dL


 


Total Bilirubin    (0.2-1.3)  mg/dL


 


AST    (17-59)  U/L


 


ALT    (21-72)  U/L


 


Alkaline Phosphatase    ()  U/L


 


Total Creatine Kinase    ()  U/L


 


CK-MB (CK-2)    (0.0-2.4)  ng/mL


 


CK-MB (CK-2) Rel Index    


 


Troponin I    (0.000-0.034)  ng/mL


 


Total Protein    (6.3-8.2)  g/dL


 


Albumin    (3.5-5.0)  g/dL


 


Urine Opiates Screen   Detected H  (NotDetected)  


 


Ur Oxycodone Screen   Not Detected  (NotDetected)  


 


Urine Methadone Screen   Not Detected  (NotDetected)  


 


Ur Propoxyphene Screen   Not Detected  (NotDetected)  


 


Ur Barbiturates Screen   Not Detected  (NotDetected)  


 


U Tricyclic Antidepress   Not Detected  (NotDetected)  


 


Ur Phencyclidine Scrn   Not Detected  (NotDetected)  


 


Ur Amphetamines Screen   Not Detected  (NotDetected)  


 


U Methamphetamines Scrn   Not Detected  (NotDetected)  


 


U Benzodiazepines Scrn   Not Detected  (NotDetected)  


 


Urine Cocaine Screen   Not Detected  (NotDetected)  


 


U Marijuana (THC) Screen   Detected H  (NotDetected)  














Disposition


Clinical Impression: 


 Paranoid schizophrenia





Disposition: HOME SELF-CARE


Condition: Good


Instructions:  Palpitations (ED)


Is patient prescribed a controlled substance at d/c from ED?: No


Referrals: 


Raymundo Lynch DO [Primary Care Provider] - 1-2 days


Time of Disposition: 09:50

## 2021-06-18 NOTE — ED
General Adult HPI





- General


Chief complaint: Psychiatric Symptoms


Stated complaint: WEIGHTLOSS, NOT EATING


Time Seen by Provider: 10/13/17 11:26


Source: patient, family, RN notes reviewed


Mode of arrival: wheelchair


Limitations: physical limitation





- History of Present Illness


Initial comments: 





Patient is a pleasant 46-year-old male presenting to the emergency Department 

with family.  There is concerned the patient has not been eating.  Patient does 

admit to not eating for the past 52 days.  Patient states he was trying to make 

a 0.2 a group of people.  Patient states he stopped eating so they would leave 

him alone and stop bothering him.  Patient states they did not stop bothering 

him so he continued to not eat.  Patient has only had water intake  over the 

past 52 days.  Patient states he told them if they did not stop bothering him 

he would continue to not eat until he .  Patient admits to feeling 

generally weak.  Patient has muscle spasms.  Patient is only able to walk a 

couple of feet.  Patient has dark black urine.  Patient denies suicidal 

ideation however does state that he would continue this until he .  Brother 

adds that patient does have a history of similar problems previously once 

associated with psychiatric problems.  Patient previously had denied this.  

Brother states patient has been acting paranoid and feels there is electrodes 

in his brain.





- Related Data


 Home Medications











 Medication  Instructions  Recorded  Confirmed


 


Doxazosin [Cardura] 1 mg PO DAILY 10/13/17 10/13/17


 


Morphine Sulfate [Morphine Sulfate 30 mg PO TID 10/13/17 10/13/17





ER]   


 


predniSONE 12.5 mg PO DAILY 10/13/17 10/13/17











 Allergies











Allergy/AdvReac Type Severity Reaction Status Date / Time


 


No Known Allergies Allergy   Verified 10/13/17 11:39














Review of Systems


ROS Statement: 


Those systems with pertinent positive or pertinent negative responses have been 

documented in the HPI.





ROS Other: All systems not noted in ROS Statement are negative.


Constitutional: Denies: fever


Eyes: Denies: eye pain


ENT: Denies: ear pain


Respiratory: Denies: cough


Cardiovascular: Denies: chest pain


Endocrine: Reports: fatigue


Gastrointestinal: Denies: abdominal pain


Genitourinary: Denies: dysuria


Musculoskeletal: Denies: back pain


Skin: Denies: rash


Neurological: Reports: weakness (Generalized)





Past Medical History


Additional Past Medical History / Comment(s): back pain


History of Any Multi-Drug Resistant Organisms: None Reported


Past Surgical History: Back Surgery, Tonsillectomy


Past Anesthesia/Blood Transfusion Reactions: No Reported Reaction


Past Psychological History: Depression


Smoking Status: Former smoker


Past Alcohol Use History: None Reported


Past Drug Use History: None Reported





General Exam


Limitations: physical limitation


General appearance: alert, cachectic


Head exam: Present: atraumatic


Eye exam: Present: normal appearance, PERRL


ENT exam: Present: normal oropharynx


Neck exam: Present: normal inspection


Respiratory exam: Present: normal lung sounds bilaterally


Cardiovascular Exam: Present: tachycardia


GI/Abdominal exam: Present: soft.  Absent: distended, tenderness


Extremities exam: Present: normal inspection


Neurological exam: Present: alert.  Absent: motor sensory deficit


Psychiatric exam: Present: normal affect, normal mood


Skin exam: Present: normal color





Course


 Vital Signs











  10/13/17 10/13/17 10/13/17





  11:06 12:54 13:35


 


Temperature 97.7 F  98.1 F


 


Pulse Rate 154 H 84 87


 


Respiratory 18 18 16





Rate   


 


Blood Pressure 114/80 132/81 121/82


 


O2 Sat by Pulse 100 94 L 100





Oximetry   














EKG Findings





- EKG Comments:


EKG Findings:: Sinus tachycardia 139.  .  QRS 90.  QT 36.  QTc 435.  

Normal axis.  Inferior Q waves.  No acute ST change.





Medical Decision Making





- Medical Decision Making





Patient reevaluated and updated.  Case was discussed in detail with Dr. Miller, 

who will admit for hospital call.  Further evaluation will need to be done in 

the liver.  Consult placed for GI and psychiatry.





- Lab Data


Result diagrams: 


 10/13/17 11:21





 10/13/17 11:21


 Lab Results











  10/13/17 10/13/17 10/13/17 Range/Units





  11:21 11:21 11:21 


 


WBC   14.1 H   (3.8-10.6)  k/uL


 


RBC   5.50   (4.30-5.90)  m/uL


 


Hgb   16.0   (13.0-17.5)  gm/dL


 


Hct   48.3   (39.0-53.0)  %


 


MCV   87.8   (80.0-100.0)  fL


 


MCH   29.0   (25.0-35.0)  pg


 


MCHC   33.1   (31.0-37.0)  g/dL


 


RDW   14.0   (11.5-15.5)  %


 


Plt Count   472 H   (150-450)  k/uL


 


Neutrophils %   83   %


 


Lymphocytes %   11   %


 


Monocytes %   5   %


 


Eosinophils %   0   %


 


Basophils %   0   %


 


Neutrophils #   11.7 H   (1.3-7.7)  k/uL


 


Lymphocytes #   1.5   (1.0-4.8)  k/uL


 


Monocytes #   0.6   (0-1.0)  k/uL


 


Eosinophils #   0.1   (0-0.7)  k/uL


 


Basophils #   0.1   (0-0.2)  k/uL


 


PT    12.3 H  (9.0-12.0)  sec


 


INR    1.2 H  (<1.2)  


 


APTT    28.4  (22.0-30.0)  sec


 


Sodium  133 L    (137-145)  mmol/L


 


Potassium  3.3 L    (3.5-5.1)  mmol/L


 


Chloride  81 L    ()  mmol/L


 


Carbon Dioxide  19 L    (22-30)  mmol/L


 


Anion Gap  33    mmol/L


 


BUN  24 H    (9-20)  mg/dL


 


Creatinine  1.22    (0.66-1.25)  mg/dL


 


Est GFR (MDRD) Af Amer  >60    (>60 ml/min/1.73 sqM)  


 


Est GFR (MDRD) Non-Af  >60    (>60 ml/min/1.73 sqM)  


 


Glucose  180 H    (74-99)  mg/dL


 


Calcium  10.1    (8.4-10.2)  mg/dL


 


Ionized Calcium Lory  4.0 L    (4.5-5.3)  mg/dL


 


Phosphorus  4.0    (2.5-4.5)  mg/dL


 


Magnesium  2.6 H    (1.6-2.3)  mg/dL


 


Total Bilirubin  5.6 H    (0.2-1.3)  mg/dL


 


AST  273 H    (17-59)  U/L


 


ALT  125 H    (21-72)  U/L


 


Alkaline Phosphatase  2131 H    ()  U/L


 


Creatine Kinase     ()  U/L


 


Total Protein  7.7    (6.3-8.2)  g/dL


 


Albumin  4.7    (3.5-5.0)  g/dL


 


Lipase     ()  U/L


 


TSH  0.467    (0.465-4.680)  mIU/L


 


Serum Alcohol  <10    mg/dL














  10/13/17 10/13/17 Range/Units





  11:21 11:21 


 


WBC    (3.8-10.6)  k/uL


 


RBC    (4.30-5.90)  m/uL


 


Hgb    (13.0-17.5)  gm/dL


 


Hct    (39.0-53.0)  %


 


MCV    (80.0-100.0)  fL


 


MCH    (25.0-35.0)  pg


 


MCHC    (31.0-37.0)  g/dL


 


RDW    (11.5-15.5)  %


 


Plt Count    (150-450)  k/uL


 


Neutrophils %    %


 


Lymphocytes %    %


 


Monocytes %    %


 


Eosinophils %    %


 


Basophils %    %


 


Neutrophils #    (1.3-7.7)  k/uL


 


Lymphocytes #    (1.0-4.8)  k/uL


 


Monocytes #    (0-1.0)  k/uL


 


Eosinophils #    (0-0.7)  k/uL


 


Basophils #    (0-0.2)  k/uL


 


PT    (9.0-12.0)  sec


 


INR    (<1.2)  


 


APTT    (22.0-30.0)  sec


 


Sodium    (137-145)  mmol/L


 


Potassium    (3.5-5.1)  mmol/L


 


Chloride    ()  mmol/L


 


Carbon Dioxide    (22-30)  mmol/L


 


Anion Gap    mmol/L


 


BUN    (9-20)  mg/dL


 


Creatinine    (0.66-1.25)  mg/dL


 


Est GFR (MDRD) Af Amer    (>60 ml/min/1.73 sqM)  


 


Est GFR (MDRD) Non-Af    (>60 ml/min/1.73 sqM)  


 


Glucose    (74-99)  mg/dL


 


Calcium    (8.4-10.2)  mg/dL


 


Ionized Calcium Lory    (4.5-5.3)  mg/dL


 


Phosphorus    (2.5-4.5)  mg/dL


 


Magnesium    (1.6-2.3)  mg/dL


 


Total Bilirubin    (0.2-1.3)  mg/dL


 


AST    (17-59)  U/L


 


ALT    (21-72)  U/L


 


Alkaline Phosphatase    ()  U/L


 


Creatine Kinase  34 L   ()  U/L


 


Total Protein    (6.3-8.2)  g/dL


 


Albumin    (3.5-5.0)  g/dL


 


Lipase   610 H  ()  U/L


 


TSH    (0.465-4.680)  mIU/L


 


Serum Alcohol    mg/dL














- Radiology Data


Radiology results: image reviewed (Chest x-ray shows no acute process)





Disposition


Clinical Impression: 


 Starvation, Acute liver failure, Acute psychosis





Disposition: ADMITTED AS IP TO THIS [facility]


Condition: Serious


Referrals: 


Raymundo Lynch DO [Primary Care Provider] - 1-2 days


Decision Time: 14:42 Statement Selected